# Patient Record
Sex: MALE | Race: WHITE | NOT HISPANIC OR LATINO
[De-identification: names, ages, dates, MRNs, and addresses within clinical notes are randomized per-mention and may not be internally consistent; named-entity substitution may affect disease eponyms.]

---

## 2017-02-25 PROBLEM — Z00.00 ENCOUNTER FOR PREVENTIVE HEALTH EXAMINATION: Status: ACTIVE | Noted: 2017-02-25

## 2017-04-06 ENCOUNTER — APPOINTMENT (OUTPATIENT)
Dept: UROLOGY | Facility: CLINIC | Age: 62
End: 2017-04-06

## 2017-05-25 ENCOUNTER — APPOINTMENT (OUTPATIENT)
Dept: UROLOGY | Facility: CLINIC | Age: 62
End: 2017-05-25

## 2017-05-25 VITALS
SYSTOLIC BLOOD PRESSURE: 144 MMHG | BODY MASS INDEX: 29.7 KG/M2 | DIASTOLIC BLOOD PRESSURE: 91 MMHG | WEIGHT: 196 LBS | HEIGHT: 68 IN | HEART RATE: 73 BPM

## 2017-05-25 DIAGNOSIS — R97.20 ELEVATED PROSTATE, SPECIFIC ANTIGEN [PSA]: ICD-10-CM

## 2017-06-14 ENCOUNTER — OUTPATIENT (OUTPATIENT)
Dept: OUTPATIENT SERVICES | Facility: HOSPITAL | Age: 62
LOS: 1 days | Discharge: HOME | End: 2017-06-14

## 2017-06-28 DIAGNOSIS — R97.20 ELEVATED PROSTATE SPECIFIC ANTIGEN [PSA]: ICD-10-CM

## 2017-06-29 ENCOUNTER — APPOINTMENT (OUTPATIENT)
Dept: UROLOGY | Facility: CLINIC | Age: 62
End: 2017-06-29

## 2017-06-29 ENCOUNTER — OUTPATIENT (OUTPATIENT)
Dept: OUTPATIENT SERVICES | Facility: HOSPITAL | Age: 62
LOS: 1 days | Discharge: HOME | End: 2017-06-29

## 2017-06-29 VITALS — DIASTOLIC BLOOD PRESSURE: 80 MMHG | TEMPERATURE: 89 F | SYSTOLIC BLOOD PRESSURE: 138 MMHG

## 2017-06-29 DIAGNOSIS — R68.89 OTHER GENERAL SYMPTOMS AND SIGNS: ICD-10-CM

## 2017-06-30 DIAGNOSIS — R89.7 ABNORMAL HISTOLOGICAL FINDINGS IN SPECIMENS FROM OTHER ORGANS, SYSTEMS AND TISSUES: ICD-10-CM

## 2017-07-13 ENCOUNTER — APPOINTMENT (OUTPATIENT)
Dept: UROLOGY | Facility: CLINIC | Age: 62
End: 2017-07-13

## 2017-07-13 VITALS
BODY MASS INDEX: 29.7 KG/M2 | SYSTOLIC BLOOD PRESSURE: 156 MMHG | HEART RATE: 87 BPM | HEIGHT: 68 IN | DIASTOLIC BLOOD PRESSURE: 96 MMHG | WEIGHT: 196 LBS

## 2017-09-01 ENCOUNTER — OUTPATIENT (OUTPATIENT)
Dept: OUTPATIENT SERVICES | Facility: HOSPITAL | Age: 62
LOS: 1 days | Discharge: HOME | End: 2017-09-01

## 2017-09-01 DIAGNOSIS — C61 MALIGNANT NEOPLASM OF PROSTATE: ICD-10-CM

## 2017-09-08 ENCOUNTER — OUTPATIENT (OUTPATIENT)
Dept: OUTPATIENT SERVICES | Facility: HOSPITAL | Age: 62
LOS: 1 days | Discharge: HOME | End: 2017-09-08

## 2017-09-08 DIAGNOSIS — C61 MALIGNANT NEOPLASM OF PROSTATE: ICD-10-CM

## 2017-09-21 ENCOUNTER — APPOINTMENT (OUTPATIENT)
Dept: UROLOGY | Facility: CLINIC | Age: 62
End: 2017-09-21
Payer: COMMERCIAL

## 2017-09-21 VITALS
WEIGHT: 196 LBS | SYSTOLIC BLOOD PRESSURE: 144 MMHG | HEART RATE: 87 BPM | BODY MASS INDEX: 29.7 KG/M2 | HEIGHT: 68 IN | DIASTOLIC BLOOD PRESSURE: 87 MMHG

## 2017-09-21 PROCEDURE — 99213 OFFICE O/P EST LOW 20 MIN: CPT

## 2018-03-22 ENCOUNTER — APPOINTMENT (OUTPATIENT)
Dept: UROLOGY | Facility: CLINIC | Age: 63
End: 2018-03-22
Payer: COMMERCIAL

## 2018-03-22 VITALS
WEIGHT: 196 LBS | HEIGHT: 68 IN | SYSTOLIC BLOOD PRESSURE: 120 MMHG | HEART RATE: 77 BPM | BODY MASS INDEX: 29.7 KG/M2 | DIASTOLIC BLOOD PRESSURE: 77 MMHG

## 2018-03-22 DIAGNOSIS — Z87.828 PERSONAL HISTORY OF OTHER (HEALED) PHYSICAL INJURY AND TRAUMA: ICD-10-CM

## 2018-03-22 DIAGNOSIS — Z78.9 OTHER SPECIFIED HEALTH STATUS: ICD-10-CM

## 2018-03-22 DIAGNOSIS — R97.20 ELEVATED PROSTATE, SPECIFIC ANTIGEN [PSA]: ICD-10-CM

## 2018-03-22 DIAGNOSIS — Z86.39 PERSONAL HISTORY OF OTHER ENDOCRINE, NUTRITIONAL AND METABOLIC DISEASE: ICD-10-CM

## 2018-03-22 DIAGNOSIS — Z86.79 PERSONAL HISTORY OF OTHER DISEASES OF THE CIRCULATORY SYSTEM: ICD-10-CM

## 2018-03-22 DIAGNOSIS — Z83.3 FAMILY HISTORY OF DIABETES MELLITUS: ICD-10-CM

## 2018-03-22 PROCEDURE — 99213 OFFICE O/P EST LOW 20 MIN: CPT

## 2018-03-22 NOTE — LETTER HEADER
[Ryan Baker MD] : Ryan Bkaer MD [Director of Urologic Oncology] : Director of Urologic Oncology [Cancer Services] : Cancer Services [Tel [phone]] : Tel [phone] [Fax [phone]] : Fax [phone]

## 2018-03-22 NOTE — LETTER BODY
[Dear  ___] : Dear  [unfilled], [I had the pleasure of evaluating your patient, [unfilled]. Thank you for referring this patient for consultation for ___] : I had the pleasure of evaluating your patient, [unfilled]. Thank you for referring this patient for consultation for [unfilled]. [Attached please find my note.] : Attached please find my note. [Thank you very much for allowing me to participate in the care of this patient. If you have any questions, please do not hesitate to contact me] : Thank you very much for allowing me to participate in the care of this patient. If you have any questions, please do not hesitate to contact me. [FreeTextEntry2] : Dr Kayden Day

## 2018-03-22 NOTE — HISTORY OF PRESENT ILLNESS
[FreeTextEntry1] : KAREN BROWN is a 62 year old male with a past medical history of elevated PSA. Presents to the office today for a follow up, last seen on 9/21/2017. Patient had a prostate biopsy on 6/29/2017 and revealed low volume Karla 3+3 in 2% of 1 core in the mid base right side. Last PSA 3.11 on 9/11/2017. Patient did not have PSA performed this visit, scripts given to him to have done. MRI prostate done on 9/8/2017 revealed Pirads 2.

## 2018-03-22 NOTE — ASSESSMENT
[FreeTextEntry1] : 63 yo with low risk, low volume prostate cancer\par \par MRI 9/2017 - \par volume 35 grams\par PIRADS 2\par \par \par PSA - 3.11 9/2017\par \par reports no new complaints\par \par - PSA in 6 months\par - MRI in 6 months\par - F/U in 6 months\par \par patient is on surveillance for prostate cancer

## 2018-04-06 ENCOUNTER — OUTPATIENT (OUTPATIENT)
Dept: OUTPATIENT SERVICES | Facility: HOSPITAL | Age: 63
LOS: 1 days | Discharge: HOME | End: 2018-04-06

## 2018-04-06 ENCOUNTER — LABORATORY RESULT (OUTPATIENT)
Age: 63
End: 2018-04-06

## 2018-04-06 DIAGNOSIS — I10 ESSENTIAL (PRIMARY) HYPERTENSION: ICD-10-CM

## 2018-04-06 DIAGNOSIS — E78.00 PURE HYPERCHOLESTEROLEMIA, UNSPECIFIED: ICD-10-CM

## 2018-04-06 DIAGNOSIS — E66.9 OBESITY, UNSPECIFIED: ICD-10-CM

## 2018-04-09 LAB
APPEARANCE: CLEAR
BACTERIA UR CULT: NORMAL
BILIRUBIN URINE: NEGATIVE
BLOOD URINE: NEGATIVE
COLOR: YELLOW
GLUCOSE QUALITATIVE U: NEGATIVE MG/DL
KETONES URINE: NEGATIVE
LEUKOCYTE ESTERASE URINE: NEGATIVE
NITRITE URINE: NEGATIVE
PH URINE: 6.5
PROTEIN URINE: NEGATIVE MG/DL
PSA FREE FLD-MCNC: 15.8
PSA FREE SERPL-MCNC: 0.71 NG/ML
PSA SERPL-MCNC: 4.48 NG/ML
SPECIFIC GRAVITY URINE: 1.03
UROBILINOGEN URINE: NEGATIVE MG/DL

## 2018-08-24 ENCOUNTER — OUTPATIENT (OUTPATIENT)
Dept: OUTPATIENT SERVICES | Facility: HOSPITAL | Age: 63
LOS: 1 days | Discharge: HOME | End: 2018-08-24
Payer: COMMERCIAL

## 2018-08-24 PROCEDURE — 93970 EXTREMITY STUDY: CPT | Mod: 26

## 2018-08-29 DIAGNOSIS — M79.89 OTHER SPECIFIED SOFT TISSUE DISORDERS: ICD-10-CM

## 2018-09-03 PROBLEM — R97.20 ELEVATED PROSTATE SPECIFIC ANTIGEN (PSA): Status: ACTIVE | Noted: 2017-05-25

## 2018-09-21 ENCOUNTER — LABORATORY RESULT (OUTPATIENT)
Age: 63
End: 2018-09-21

## 2018-09-21 ENCOUNTER — OUTPATIENT (OUTPATIENT)
Dept: OUTPATIENT SERVICES | Facility: HOSPITAL | Age: 63
LOS: 1 days | Discharge: HOME | End: 2018-09-21

## 2018-09-21 DIAGNOSIS — R97.20 ELEVATED PROSTATE SPECIFIC ANTIGEN [PSA]: ICD-10-CM

## 2018-09-26 ENCOUNTER — FORM ENCOUNTER (OUTPATIENT)
Age: 63
End: 2018-09-26

## 2018-09-27 ENCOUNTER — OUTPATIENT (OUTPATIENT)
Dept: OUTPATIENT SERVICES | Facility: HOSPITAL | Age: 63
LOS: 1 days | Discharge: HOME | End: 2018-09-27

## 2018-09-27 DIAGNOSIS — R97.20 ELEVATED PROSTATE SPECIFIC ANTIGEN [PSA]: ICD-10-CM

## 2018-10-02 ENCOUNTER — OUTPATIENT (OUTPATIENT)
Dept: OUTPATIENT SERVICES | Facility: HOSPITAL | Age: 63
LOS: 1 days | Discharge: HOME | End: 2018-10-02

## 2018-10-02 DIAGNOSIS — I10 ESSENTIAL (PRIMARY) HYPERTENSION: ICD-10-CM

## 2018-10-02 DIAGNOSIS — E66.9 OBESITY, UNSPECIFIED: ICD-10-CM

## 2018-10-02 DIAGNOSIS — E78.5 HYPERLIPIDEMIA, UNSPECIFIED: ICD-10-CM

## 2018-10-08 ENCOUNTER — APPOINTMENT (OUTPATIENT)
Dept: UROLOGY | Facility: CLINIC | Age: 63
End: 2018-10-08
Payer: COMMERCIAL

## 2018-10-08 VITALS
HEART RATE: 88 BPM | DIASTOLIC BLOOD PRESSURE: 91 MMHG | SYSTOLIC BLOOD PRESSURE: 135 MMHG | HEIGHT: 68 IN | WEIGHT: 196 LBS | BODY MASS INDEX: 29.7 KG/M2

## 2018-10-08 DIAGNOSIS — N52.9 MALE ERECTILE DYSFUNCTION, UNSPECIFIED: ICD-10-CM

## 2018-10-08 PROCEDURE — 99213 OFFICE O/P EST LOW 20 MIN: CPT

## 2018-10-08 RX ORDER — CIPROFLOXACIN HYDROCHLORIDE 500 MG/1
500 TABLET, FILM COATED ORAL
Qty: 6 | Refills: 0 | Status: ACTIVE | COMMUNITY
Start: 2018-10-08 | End: 1900-01-01

## 2018-10-08 RX ORDER — ENEMA 19; 7 G/133ML; G/133ML
7-19 ENEMA RECTAL
Qty: 1 | Refills: 0 | Status: ACTIVE | COMMUNITY
Start: 2018-10-08 | End: 1900-01-01

## 2018-10-09 NOTE — LETTER BODY
[Dear  ___] : Dear  [unfilled], [Consult Letter:] : I had the pleasure of evaluating your patient, [unfilled]. [Please see my note below.] : Please see my note below. [Consult Closing:] : Thank you very much for allowing me to participate in the care of this patient.  If you have any questions, please do not hesitate to contact me. [Sincerely,] : Sincerely, [FreeTextEntry3] : Harpreet Baker MD, FACS\par

## 2018-10-09 NOTE — ASSESSMENT
[FreeTextEntry1] : 63 yo with low risk, low volume prostate cancer\par \par MRI 9/2018 - \par volume 50 grams\par PIRADS 3\par \par PSA - 3.85 9/2018\par \par reports no new complaints\par \par - PSA in 6 months\par - repeat biopsy in 6 months

## 2018-10-09 NOTE — HISTORY OF PRESENT ILLNESS
[Urinary Urgency] : urinary urgency [Urinary Frequency] : urinary frequency [Erectile Dysfunction] : Erectile Dysfunction [None] : None [FreeTextEntry1] : 62 yo with low volume Karla 3+3 in 2% of 1 core in the mid base right side\par prostate biopsy on June 29, 2017\par \par he is here to review his recent PSA, MRI and oncotype\par \par PSA 9/24/2018 - 3.85\par PSA 9/1/2017 - 3.11\par  MRI 9/8/2017 - 50 gram prostate\par pirads 3 \par oncotype - insufficient  volume of cancer to assess\par \par reports no new complaints [Urinary Incontinence] : no urinary incontinence [Urinary Retention] : no urinary retention

## 2019-02-01 ENCOUNTER — EMERGENCY (EMERGENCY)
Facility: HOSPITAL | Age: 64
LOS: 0 days | Discharge: HOME | End: 2019-02-01
Admitting: PHYSICIAN ASSISTANT

## 2019-02-01 VITALS
DIASTOLIC BLOOD PRESSURE: 64 MMHG | HEART RATE: 91 BPM | OXYGEN SATURATION: 97 % | RESPIRATION RATE: 18 BRPM | TEMPERATURE: 98 F | SYSTOLIC BLOOD PRESSURE: 125 MMHG

## 2019-02-01 DIAGNOSIS — Z79.899 OTHER LONG TERM (CURRENT) DRUG THERAPY: ICD-10-CM

## 2019-02-01 DIAGNOSIS — E78.00 PURE HYPERCHOLESTEROLEMIA, UNSPECIFIED: ICD-10-CM

## 2019-02-01 DIAGNOSIS — S61.317A LACERATION WITHOUT FOREIGN BODY OF LEFT LITTLE FINGER WITH DAMAGE TO NAIL, INITIAL ENCOUNTER: ICD-10-CM
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
## 2019-02-01 DIAGNOSIS — I10 ESSENTIAL (PRIMARY) HYPERTENSION: ICD-10-CM

## 2019-02-01 DIAGNOSIS — Z23 ENCOUNTER FOR IMMUNIZATION: ICD-10-CM
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
## 2019-02-01 DIAGNOSIS — E78.5 HYPERLIPIDEMIA, UNSPECIFIED: ICD-10-CM

## 2019-02-01 DIAGNOSIS — M79.646 PAIN IN UNSPECIFIED FINGER(S): ICD-10-CM
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
## 2019-02-01 RX ORDER — TETANUS TOXOID, REDUCED DIPHTHERIA TOXOID AND ACELLULAR PERTUSSIS VACCINE, ADSORBED 5; 2.5; 8; 8; 2.5 [IU]/.5ML; [IU]/.5ML; UG/.5ML; UG/.5ML; UG/.5ML
0.5 SUSPENSION INTRAMUSCULAR ONCE
Qty: 0 | Refills: 0 | Status: COMPLETED | OUTPATIENT
Start: 2019-02-01 | End: 2019-02-01

## 2019-02-01 RX ORDER — CEPHALEXIN 500 MG
1 CAPSULE ORAL
Qty: 28 | Refills: 0
Start: 2019-02-01 | End: 2019-02-07

## 2019-02-01 RX ADMIN — TETANUS TOXOID, REDUCED DIPHTHERIA TOXOID AND ACELLULAR PERTUSSIS VACCINE, ADSORBED 0.5 MILLILITER(S): 5; 2.5; 8; 8; 2.5 SUSPENSION INTRAMUSCULAR at 17:04

## 2019-02-01 NOTE — ED PROVIDER NOTE - NS ED ROS FT
Review of Systems:  	•	CONSTITUTIONAL - no fever, no diaphoresis, no chills  	•	SKIN - + laceration   	•	MUSCULOSKELETAL - no joint paint, no swelling, no redness  	•	NEUROLOGIC - no weakness, no headache, no paresthesias

## 2019-02-01 NOTE — ED PROVIDER NOTE - CARE PLAN
Principal Discharge DX:	Nail avulsion, finger  Secondary Diagnosis:	Laceration of finger of left hand

## 2019-02-01 NOTE — ED PROVIDER NOTE - NSFOLLOWUPINSTRUCTIONS_ED_ALL_ED_FT
Laceration    A laceration is a cut that goes through all of the layers of the skin and into the tissue that is right under the skin. Some lacerations heal on their own. Others need to be closed with stitches (sutures), staples, skin adhesive strips, or skin glue. Proper laceration care minimizes the risk of infection and helps the laceration to heal better.     SEEK IMMEDIATE MEDICAL CARE IF YOU HAVE THE FOLLOWING SYMPTOMS: swelling around the wound, worsening pain, drainage from the wound, red streaking going away from your wound, inability to move finger or toe near the laceration, or discoloration of skin near the laceration. 14-Nov-2017

## 2019-02-01 NOTE — ED PROVIDER NOTE - OBJECTIVE STATEMENT
63 year old male with pmhx of HTN and HLD, presents with finger injury. Pt admits sliced left 5th digit with hammer. Pt denies numbness.

## 2019-02-01 NOTE — ED PROVIDER NOTE - PHYSICAL EXAMINATION
CONST: Well appearing in NAD  MS: Normal ROM. pulses 2 +  SKIN: + small laceration to left 5th digit, distal aspect, medial aspect involving nail. no tendon involvement   NEURO: Strength 5/5 with no sensory deficits.

## 2019-02-01 NOTE — ED PROVIDER NOTE - CARE PROVIDER_API CALL
Lydia Wolfe (MD)  Surgery of the Hand  [facility] [place] [address]  Phone: [phone]  Fax: [phone]

## 2019-04-04 ENCOUNTER — LABORATORY RESULT (OUTPATIENT)
Age: 64
End: 2019-04-04

## 2019-04-04 ENCOUNTER — OUTPATIENT (OUTPATIENT)
Dept: OUTPATIENT SERVICES | Facility: HOSPITAL | Age: 64
LOS: 1 days | Discharge: HOME | End: 2019-04-04

## 2019-04-04 DIAGNOSIS — C61 MALIGNANT NEOPLASM OF PROSTATE: ICD-10-CM

## 2019-04-04 PROBLEM — I10 ESSENTIAL (PRIMARY) HYPERTENSION: Chronic | Status: ACTIVE | Noted: 2019-02-01

## 2019-04-04 PROBLEM — E78.00 PURE HYPERCHOLESTEROLEMIA, UNSPECIFIED: Chronic | Status: ACTIVE | Noted: 2019-02-01

## 2019-04-06 ENCOUNTER — LABORATORY RESULT (OUTPATIENT)
Age: 64
End: 2019-04-06

## 2019-04-08 RX ORDER — CIPROFLOXACIN HYDROCHLORIDE 500 MG/1
500 TABLET, FILM COATED ORAL
Qty: 8 | Refills: 0 | Status: ACTIVE | COMMUNITY
Start: 2019-04-08 | End: 1900-01-01

## 2019-04-25 ENCOUNTER — APPOINTMENT (OUTPATIENT)
Dept: UROLOGY | Facility: CLINIC | Age: 64
End: 2019-04-25
Payer: COMMERCIAL

## 2019-04-25 VITALS — BODY MASS INDEX: 29.7 KG/M2 | HEIGHT: 68 IN | WEIGHT: 196 LBS

## 2019-04-25 PROCEDURE — 99214 OFFICE O/P EST MOD 30 MIN: CPT

## 2019-04-25 NOTE — HISTORY OF PRESENT ILLNESS
[Urinary Urgency] : urinary urgency [Urinary Frequency] : urinary frequency [Erectile Dysfunction] : Erectile Dysfunction [None] : None [FreeTextEntry1] : 62 yo with low volume Karla 3+3 in 2% of 1 core in the mid base right side\par prostate biopsy on June 29, 2017\par \par he is here to review his recent PSA\par \par PSA 4/8/2019 - 4.12 with 15% free \par PSA 9/24/2018 - 3.85\par PSA 9/1/2017 - 3.11\par  MRI 9/8/2017 - 50 gram prostate\par pirads 3 \par oncotype - insufficient  volume of cancer to assess\par \par reports no new complaints [Urinary Incontinence] : no urinary incontinence [Urinary Retention] : no urinary retention

## 2019-04-25 NOTE — ASSESSMENT
[FreeTextEntry1] : 64 yo with low risk, low volume prostate cancer\par \par MRI 9/2018 - \par volume 50 grams\par PIRADS 3\par \par PSA - 4.12  4/8/2019\par \par \par - PSA in 3 months\par - repeat MRI in 3 months\par - repeat biopsy dependent on MRI findings \par \par I explained to Mr. Soares the need for a repeat biopsy\par but I explained that the MRI would help identify regions of interest to biopsy \par \par - f/u 7/2019

## 2019-05-23 ENCOUNTER — APPOINTMENT (OUTPATIENT)
Dept: UROLOGY | Facility: CLINIC | Age: 64
End: 2019-05-23

## 2019-06-04 ENCOUNTER — OTHER (OUTPATIENT)
Age: 64
End: 2019-06-04

## 2019-07-11 ENCOUNTER — OUTPATIENT (OUTPATIENT)
Dept: OUTPATIENT SERVICES | Facility: HOSPITAL | Age: 64
LOS: 1 days | Discharge: HOME | End: 2019-07-11

## 2019-07-11 DIAGNOSIS — E78.00 PURE HYPERCHOLESTEROLEMIA, UNSPECIFIED: ICD-10-CM

## 2019-07-11 DIAGNOSIS — E11.9 TYPE 2 DIABETES MELLITUS WITHOUT COMPLICATIONS: ICD-10-CM

## 2019-07-11 DIAGNOSIS — C61 MALIGNANT NEOPLASM OF PROSTATE: ICD-10-CM

## 2019-07-21 ENCOUNTER — FORM ENCOUNTER (OUTPATIENT)
Age: 64
End: 2019-07-21

## 2019-07-22 ENCOUNTER — OUTPATIENT (OUTPATIENT)
Dept: OUTPATIENT SERVICES | Facility: HOSPITAL | Age: 64
LOS: 1 days | Discharge: HOME | End: 2019-07-22
Payer: COMMERCIAL

## 2019-07-22 DIAGNOSIS — C61 MALIGNANT NEOPLASM OF PROSTATE: ICD-10-CM

## 2019-07-22 PROCEDURE — 72197 MRI PELVIS W/O & W/DYE: CPT | Mod: 26

## 2019-07-25 ENCOUNTER — APPOINTMENT (OUTPATIENT)
Dept: UROLOGY | Facility: CLINIC | Age: 64
End: 2019-07-25
Payer: COMMERCIAL

## 2019-07-25 PROCEDURE — 99213 OFFICE O/P EST LOW 20 MIN: CPT

## 2019-07-25 NOTE — ASSESSMENT
[FreeTextEntry1] : 65 yo with low risk, low volume prostate cancer\par \par MRI 7/2019 - \par Volume 55 grams\par PIRADS 2\par \par PSA - 4.28   7/11/2019\par PSA - 4.12   4/8/2019\par \par - PSA in 6 months\par - f/u in 6 months \par \par

## 2019-07-25 NOTE — HISTORY OF PRESENT ILLNESS
[Urinary Urgency] : urinary urgency [Urinary Frequency] : urinary frequency [Erectile Dysfunction] : Erectile Dysfunction [None] : None [FreeTextEntry1] : 63 yo with low volume Karla 3+3 in 2% of 1 core in the mid base right side\par prostate biopsy on June 29, 2017\par \par he is here to review his recent PSA and MRI\par \par PSA 7/11/2019 - 4.83 with 17% free\par PSA 4/8/2019 - 4.12 with 15% free \par PSA 9/24/2018 - 3.85\par PSA 9/1/2017 - 3.11\par \par MRI 7/22/2019 \par 55 grams \par no hemorrhage\par BPH yes\par no lesions PIRADS 2\par MRI 9/8/2017 - 50 gram prostate\par pirads 3 \par oncotype - insufficient  volume of cancer to assess\par \par reports no new complaints [Urinary Incontinence] : no urinary incontinence [Urinary Retention] : no urinary retention

## 2020-01-22 ENCOUNTER — LABORATORY RESULT (OUTPATIENT)
Age: 65
End: 2020-01-22

## 2020-01-27 ENCOUNTER — APPOINTMENT (OUTPATIENT)
Dept: UROLOGY | Facility: CLINIC | Age: 65
End: 2020-01-27
Payer: COMMERCIAL

## 2020-01-27 VITALS — BODY MASS INDEX: 29.7 KG/M2 | WEIGHT: 196 LBS | HEIGHT: 68 IN

## 2020-01-27 PROCEDURE — 99214 OFFICE O/P EST MOD 30 MIN: CPT

## 2020-01-27 NOTE — ASSESSMENT
[FreeTextEntry1] : 63 yo with low risk, low volume prostate cancer\par \par MRI 7/2019 - \par Volume 55 grams\par PIRADS 2\par \par PSA - 3.91 01/23/202\par PSA - 4.28   7/11/2019\par PSA - 4.12   4/8/2019\par \par - PSA in 6 months\par - f/u in 6 months \par \par

## 2020-01-27 NOTE — HISTORY OF PRESENT ILLNESS
[Urinary Urgency] : urinary urgency [Urinary Frequency] : urinary frequency [Erectile Dysfunction] : Erectile Dysfunction [None] : None [FreeTextEntry1] : 65 yo with low volume Karla 3+3 in 2% of 1 core in the mid base right side\par prostate biopsy on June 29, 2017\par \par he is here to review his recent PSA \par \par PSA 1/22/20    - 3.91 with 17% free\par PSA 7/11/2019 - 4.83 with 17% free\par PSA 4/8/2019 - 4.12 with 15% free \par PSA 9/24/2018 - 3.85\par PSA 9/1/2017 - 3.11\par \par MRI 7/22/2019 \par 55 grams \par no hemorrhage\par BPH yes\par no lesions PIRADS 2\par \par MRI 9/8/2017 - 50 gram prostate\par pirads 3 \par oncotype - insufficient  volume of cancer to assess\par \par reports no new complaints [Urinary Incontinence] : no urinary incontinence [Urinary Retention] : no urinary retention

## 2020-07-01 LAB
PSA FREE FLD-MCNC: 17 %
PSA FREE SERPL-MCNC: 0.7 NG/ML
PSA SERPL-MCNC: 4.21 NG/ML

## 2020-07-05 ENCOUNTER — TRANSCRIPTION ENCOUNTER (OUTPATIENT)
Age: 65
End: 2020-07-05

## 2020-08-03 ENCOUNTER — APPOINTMENT (OUTPATIENT)
Dept: UROLOGY | Facility: CLINIC | Age: 65
End: 2020-08-03
Payer: COMMERCIAL

## 2020-08-03 PROCEDURE — 99213 OFFICE O/P EST LOW 20 MIN: CPT

## 2020-08-03 NOTE — HISTORY OF PRESENT ILLNESS
[Urinary Urgency] : urinary urgency [Erectile Dysfunction] : Erectile Dysfunction [None] : None [Urinary Frequency] : urinary frequency [FreeTextEntry1] : 64 yo with low volume Karla 3+3 in 2% of 1 core in the mid base right side\par prostate biopsy on June 29, 2017\par \par he is here to review his recent PSA \par \par PSA 6/30/2020 - 4.21 with 17% free\par PSA 1/22/20    - 3.91 with 17% free\par PSA 7/11/2019 - 4.83 with 17% free\par PSA 4/8/2019 - 4.12 with 15% free \par PSA 9/24/2018 - 3.85\par PSA 9/1/2017 - 3.11\par \par MRI 7/22/2019 \par 55 grams \par no hemorrhage\par BPH yes\par no lesions PIRADS 2\par \par MRI 9/8/2017 - 50 gram prostate\par pirads 3 \par oncotype - insufficient  volume of cancer to assess\par \par reports no new complaints [Urinary Incontinence] : no urinary incontinence [Urinary Retention] : no urinary retention

## 2020-08-03 NOTE — ASSESSMENT
[FreeTextEntry1] : 64 yo with low risk, low volume prostate cancer\par \par MRI 7/2019 - \par Volume 55 grams\par PIRADS 2\par \par PSA - 4.21 07/01/2020\par PSA - 3.91 01/23/202\par PSA - 4.28   7/11/2019\par PSA - 4.12   4/8/2019\par \par - PSA in 6 months\par - f/u in 6 months \par \par

## 2020-08-03 NOTE — LETTER BODY
[Consult Letter:] : I had the pleasure of evaluating your patient, [unfilled]. [Dear  ___] : Dear  [unfilled], [Please see my note below.] : Please see my note below. [Consult Closing:] : Thank you very much for allowing me to participate in the care of this patient.  If you have any questions, please do not hesitate to contact me. [Sincerely,] : Sincerely, [FreeTextEntry3] : Harpreet Baker MD, FACS\par

## 2020-08-03 NOTE — PHYSICAL EXAM
[General Appearance - Well Developed] : well developed [General Appearance - Well Nourished] : well nourished [Well Groomed] : well groomed [Normal Appearance] : normal appearance [Abdomen Soft] : soft [General Appearance - In No Acute Distress] : no acute distress [Abdomen Tenderness] : non-tender [Costovertebral Angle Tenderness] : no ~M costovertebral angle tenderness [Edema] : no peripheral edema [Exaggerated Use Of Accessory Muscles For Inspiration] : no accessory muscle use [] : no respiratory distress [Respiration, Rhythm And Depth] : normal respiratory rhythm and effort [Oriented To Time, Place, And Person] : oriented to person, place, and time [Mood] : the mood was normal [Affect] : the affect was normal [Normal Station and Gait] : the gait and station were normal for the patient's age [Not Anxious] : not anxious [No Focal Deficits] : no focal deficits [No Palpable Adenopathy] : no palpable adenopathy

## 2020-08-19 ENCOUNTER — EMERGENCY (EMERGENCY)
Facility: HOSPITAL | Age: 65
LOS: 0 days | Discharge: HOME | End: 2020-08-19
Attending: EMERGENCY MEDICINE | Admitting: EMERGENCY MEDICINE
Payer: COMMERCIAL

## 2020-08-19 VITALS
SYSTOLIC BLOOD PRESSURE: 120 MMHG | TEMPERATURE: 98 F | OXYGEN SATURATION: 99 % | DIASTOLIC BLOOD PRESSURE: 80 MMHG | HEART RATE: 75 BPM | RESPIRATION RATE: 18 BRPM

## 2020-08-19 VITALS — HEIGHT: 68 IN | WEIGHT: 199.96 LBS

## 2020-08-19 DIAGNOSIS — H92.09 OTALGIA, UNSPECIFIED EAR: ICD-10-CM

## 2020-08-19 DIAGNOSIS — I10 ESSENTIAL (PRIMARY) HYPERTENSION: ICD-10-CM

## 2020-08-19 DIAGNOSIS — Z79.899 OTHER LONG TERM (CURRENT) DRUG THERAPY: ICD-10-CM

## 2020-08-19 DIAGNOSIS — E78.00 PURE HYPERCHOLESTEROLEMIA, UNSPECIFIED: ICD-10-CM

## 2020-08-19 DIAGNOSIS — H60.92 UNSPECIFIED OTITIS EXTERNA, LEFT EAR: ICD-10-CM

## 2020-08-19 DIAGNOSIS — Z79.01 LONG TERM (CURRENT) USE OF ANTICOAGULANTS: ICD-10-CM

## 2020-08-19 PROCEDURE — 99283 EMERGENCY DEPT VISIT LOW MDM: CPT

## 2020-08-19 RX ORDER — LOSARTAN/HYDROCHLOROTHIAZIDE 100MG-25MG
1 TABLET ORAL
Qty: 0 | Refills: 0 | DISCHARGE

## 2020-08-19 RX ORDER — NEOMYCIN/POLYMYXIN B/HYDROCORT
1 SUSPENSION, DROPS(FINAL DOSAGE FORM)(ML) OTIC (EAR) ONCE
Refills: 0 | Status: COMPLETED | OUTPATIENT
Start: 2020-08-19 | End: 2020-08-19

## 2020-08-19 RX ADMIN — Medication 1 DROP(S): at 01:58

## 2020-08-19 NOTE — ED PROVIDER NOTE - NS ED ROS FT
Constitutional: (-) fever  Eyes/ENT: (-) blurry vision, (-) epistaxis  Integumentary: (-) rash, (-) edema  Neurological: (-) headache, (-) altered mental status  Psychiatric: (-) hallucinations

## 2020-08-19 NOTE — ED ADULT TRIAGE NOTE - CHIEF COMPLAINT QUOTE
"My left ear shut down.  It's swollen and itchy for a long time"  Pt states that he went to an ENT a few weeks ago for this problem and cleaned wax from the ears

## 2020-08-19 NOTE — ED PROVIDER NOTE - NSFOLLOWUPINSTRUCTIONS_ED_ALL_ED_FT
You were provided with ear drops. Use these drops as directed: 4 drops in your left ear 4 times a day.    Otitis Externa    Otitis externa is a bacterial or fungal infection of the outer ear canal. This is the area from the eardrum to the outside of the ear. Otitis externa is sometimes called "swimmer's ear." Causes include swimming in dirty water, moisture remaining in ear after swimming or bathing, trauma to the ear, objects stuck in the ear, cuts or scrapes in our outside of the ear. Symptoms include itching, pain, swelling, redness in the ear canal, and fluid coming from the ear. To prevent recurrence of otitis media keep your ear dry, avoid scratching or putting objects inside your ear including cotton swabs, and avoid swimming in polluted water or poorly chlorinated pools.    SEEK MEDICAL CARE IF YOU HAVE THE FOLLOWING SYMPTOMS: fever, worsening symptoms, headache, redness/swelling/pain over the bone behind your ear.

## 2020-08-19 NOTE — ED PROVIDER NOTE - PHYSICAL EXAMINATION
Physical Exam    Vital Signs: I have reviewed the initial vital signs.  Constitutional: well-nourished, appears stated age, no acute distress  Eyes: Conjunctiva pink, Sclera clear, PERRLA, EOMI.  Ear: left canal swelling with redness, maceration debri and wax noted. Right canal nml with TM nml  Integumentary: warm, dry, no rash  Neurologic: awake, alert, cranial nerves II-XII grossly intact, extremities’ motor and sensory functions grossly intact  Psychiatric: appropriate mood, appropriate affect

## 2020-08-19 NOTE — ED PROVIDER NOTE - PATIENT PORTAL LINK FT
You can access the FollowMyHealth Patient Portal offered by [facility] by registering at the following website: [email or web address]. By joining [employer]’s FollowMyHealth portal, you will also be able to view your health information using other applications (apps) compatible with our system.

## 2020-08-19 NOTE — ED PROVIDER NOTE - CLINICAL SUMMARY MEDICAL DECISION MAKING FREE TEXT BOX
66 yo M, HTN, HLD here for L ear itching, fullness x 4 days. Does go swimming regularly. No fever, chills. Notes no drainage from ear but has excessive wax production recently.  VS normal. Has macerated skin in canal, debris, redness, sligt pain with pinna movement, no posterior swelling, no enlarged LNs. Otherwise unremarkable exam.  Sx suggestive of otitis externa, provided with corticosporin drops, advised on return precautions and ENT follow up.

## 2020-08-19 NOTE — ED PROVIDER NOTE - OBJECTIVE STATEMENT
65 year old male past medical history of HTN/HLD comes to emergency room for left ear fullness and decrease hearing for the last few weeks worse over the last 4 days. Patient states has a pool and goes swimming regularly. patient states that when he first started having symptoms he was seen by ENT told had some wax build up which was removed. patient states that symptoms have been getting progressively worse now having a hard time hearing. denies fever/chills. no neck pain, no headache, no dizziness

## 2020-11-04 ENCOUNTER — TRANSCRIPTION ENCOUNTER (OUTPATIENT)
Age: 65
End: 2020-11-04

## 2020-12-04 ENCOUNTER — OUTPATIENT (OUTPATIENT)
Dept: OUTPATIENT SERVICES | Facility: HOSPITAL | Age: 65
LOS: 1 days | Discharge: HOME | End: 2020-12-04

## 2020-12-04 ENCOUNTER — LABORATORY RESULT (OUTPATIENT)
Age: 65
End: 2020-12-04

## 2020-12-04 DIAGNOSIS — Z11.59 ENCOUNTER FOR SCREENING FOR OTHER VIRAL DISEASES: ICD-10-CM

## 2020-12-07 ENCOUNTER — RESULT REVIEW (OUTPATIENT)
Age: 65
End: 2020-12-07

## 2020-12-07 ENCOUNTER — OUTPATIENT (OUTPATIENT)
Dept: OUTPATIENT SERVICES | Facility: HOSPITAL | Age: 65
LOS: 1 days | Discharge: HOME | End: 2020-12-07
Payer: COMMERCIAL

## 2020-12-07 DIAGNOSIS — R97.20 ELEVATED PROSTATE SPECIFIC ANTIGEN [PSA]: ICD-10-CM

## 2020-12-07 PROCEDURE — 72197 MRI PELVIS W/O & W/DYE: CPT | Mod: 26

## 2021-01-07 ENCOUNTER — APPOINTMENT (OUTPATIENT)
Dept: UROLOGY | Facility: CLINIC | Age: 66
End: 2021-01-07
Payer: COMMERCIAL

## 2021-01-07 VITALS — TEMPERATURE: 97.5 F | HEIGHT: 68 IN | WEIGHT: 196 LBS | BODY MASS INDEX: 29.7 KG/M2

## 2021-01-07 PROCEDURE — 99213 OFFICE O/P EST LOW 20 MIN: CPT

## 2021-01-08 NOTE — HISTORY OF PRESENT ILLNESS
[Urinary Urgency] : urinary urgency [Urinary Frequency] : urinary frequency [Erectile Dysfunction] : Erectile Dysfunction [None] : None [FreeTextEntry1] : 64 yo with low volume Karla 3+3 in 2% of 1 core in the mid base right side\par prostate biopsy on June 29, 2017\par \par he is here to review his recent PSA \par \par PSA 6/30/2020 - 4.21 with 17% free\par PSA 1/22/20    - 3.91 with 17% free\par PSA 7/11/2019 - 4.83 with 17% free\par PSA 4/8/2019 - 4.12 with 15% free \par PSA 9/24/2018 - 3.85\par PSA 9/1/2017 - 3.11\par \par MRI 12/2020\par \par IMPRESSION:\par \par 1. No suspicious prostatic lesion.\par \par 2. Nonspecific geographic areas of linear and wedge-shaped signal abnormality within the peripheral zone, without corresponding diffusion or perfusion abnormality, possibly reflecting sequela of chronic prostatitis and/or fibrosis. PI-RADS 2.\par \par 3. Mild BPH.\par \par MRI 7/22/2019 \par 55 grams \par no hemorrhage\par BPH yes\par no lesions PIRADS 2\par \par MRI 9/8/2017 - 50 gram prostate\par pirads 3 \par oncotype - insufficient  volume of cancer to assess\par \par reports no new complaints [Urinary Incontinence] : no urinary incontinence [Urinary Retention] : no urinary retention

## 2021-01-11 LAB
PSA FREE FLD-MCNC: 17 %
PSA FREE SERPL-MCNC: 0.71 NG/ML
PSA SERPL-MCNC: 4.04 NG/ML

## 2021-01-25 ENCOUNTER — NON-APPOINTMENT (OUTPATIENT)
Age: 66
End: 2021-01-25

## 2021-01-26 ENCOUNTER — TRANSCRIPTION ENCOUNTER (OUTPATIENT)
Age: 66
End: 2021-01-26

## 2021-03-25 DIAGNOSIS — R35.0 FREQUENCY OF MICTURITION: ICD-10-CM

## 2021-03-25 RX ORDER — TADALAFIL 5 MG/1
5 TABLET ORAL
Qty: 30 | Refills: 11 | Status: ACTIVE | COMMUNITY
Start: 2021-03-25 | End: 1900-01-01

## 2021-05-23 ENCOUNTER — EMERGENCY (EMERGENCY)
Facility: HOSPITAL | Age: 66
LOS: 0 days | Discharge: HOME | End: 2021-05-23
Attending: EMERGENCY MEDICINE | Admitting: EMERGENCY MEDICINE
Payer: COMMERCIAL

## 2021-05-23 VITALS
RESPIRATION RATE: 18 BRPM | SYSTOLIC BLOOD PRESSURE: 155 MMHG | DIASTOLIC BLOOD PRESSURE: 95 MMHG | OXYGEN SATURATION: 97 % | HEART RATE: 80 BPM

## 2021-05-23 VITALS
OXYGEN SATURATION: 97 % | HEIGHT: 68 IN | RESPIRATION RATE: 20 BRPM | TEMPERATURE: 97 F | WEIGHT: 199.96 LBS | SYSTOLIC BLOOD PRESSURE: 138 MMHG | HEART RATE: 76 BPM | DIASTOLIC BLOOD PRESSURE: 89 MMHG

## 2021-05-23 DIAGNOSIS — I10 ESSENTIAL (PRIMARY) HYPERTENSION: ICD-10-CM

## 2021-05-23 DIAGNOSIS — S01.81XA LACERATION WITHOUT FOREIGN BODY OF OTHER PART OF HEAD, INITIAL ENCOUNTER: ICD-10-CM
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
## 2021-05-23 DIAGNOSIS — Z79.899 OTHER LONG TERM (CURRENT) DRUG THERAPY: ICD-10-CM

## 2021-05-23 PROCEDURE — 99283 EMERGENCY DEPT VISIT LOW MDM: CPT | Mod: 25

## 2021-05-23 PROCEDURE — 12011 RPR F/E/E/N/L/M 2.5 CM/<: CPT

## 2021-05-23 RX ORDER — TETANUS TOXOID, REDUCED DIPHTHERIA TOXOID AND ACELLULAR PERTUSSIS VACCINE, ADSORBED 5; 2.5; 8; 8; 2.5 [IU]/.5ML; [IU]/.5ML; UG/.5ML; UG/.5ML; UG/.5ML
0.5 SUSPENSION INTRAMUSCULAR ONCE
Refills: 0 | Status: DISCONTINUED | OUTPATIENT
Start: 2021-05-23 | End: 2021-05-23

## 2021-05-23 NOTE — ED PROVIDER NOTE - PHYSICAL EXAMINATION
CONSTITUTIONAL: Well-developed; well-nourished; in no acute distress.   SKIN: 1 cm laceration above R eyebrow  HEAD: Normocephalic  EYES: PERRL, EOMI, normal sclera and conjunctiva   ENT: No nasal discharge; airway clear.  NECK: Supple; non tender.  CARD: S1, S2 normal; no murmurs, gallops, or rubs. Regular rate and rhythm.   RESP: No wheezes, rales or rhonchi.  ABD: soft ntnd  EXT: Normal ROM.  No clubbing, cyanosis or edema.   NEURO: Alert, oriented, grossly unremarkable

## 2021-05-23 NOTE — ED PROVIDER NOTE - NSFOLLOWUPINSTRUCTIONS_ED_ALL_ED_FT
Laceration    A laceration is a cut that goes through all of the layers of the skin and into the tissue that is right under the skin. Some lacerations heal on their own. Others need to be closed with stitches (sutures), staples, skin adhesive strips, or skin glue. Proper laceration care minimizes the risk of infection and helps the laceration to heal better.     SEEK IMMEDIATE MEDICAL CARE IF YOU HAVE THE FOLLOWING SYMPTOMS: swelling around the wound, worsening pain, drainage from the wound, red streaking going away from your wound, inability to move finger or toe near the laceration, or discoloration of skin near the laceration. Please return to ED in 5 days to remove sutures.     Laceration    A laceration is a cut that goes through all of the layers of the skin and into the tissue that is right under the skin. Some lacerations heal on their own. Others need to be closed with stitches (sutures), staples, skin adhesive strips, or skin glue. Proper laceration care minimizes the risk of infection and helps the laceration to heal better.     SEEK IMMEDIATE MEDICAL CARE IF YOU HAVE THE FOLLOWING SYMPTOMS: swelling around the wound, worsening pain, drainage from the wound, red streaking going away from your wound, inability to move finger or toe near the laceration, or discoloration of skin near the laceration.

## 2021-05-23 NOTE — ED PROVIDER NOTE - NS ED ROS FT
Constitutional:  see HPI  Head:  no headache, dizziness, loss of consciousness  Eyes:  no visual changes; no eye pain, redness, or discharge  ENMT:  no ear pain or discharge; no hearing problems; no mouth or throat sores or lesions; no throat pain  Cardiac: no chest pain, tachycardia or palpitations  Respiratory: no cough, wheezing, shortness of breath, chest tightness, or trouble breathing  GI: no nausea, vomiting, diarrhea or abdominal pain  MS: no myalgias, muscle weakness, joint pain,or  injury; no joint swelling  Neuro: no weakness; no numbness or tingling; no seizure  Skin:  laceration

## 2021-05-23 NOTE — ED ADULT NURSE NOTE - OBJECTIVE STATEMENT
Patient reports he was hit by a door nail sticking out to his head causing laceration above right brow, denies LOC.

## 2021-05-23 NOTE — ED PROVIDER NOTE - CLINICAL SUMMARY MEDICAL DECISION MAKING FREE TEXT BOX
65M witj right forehead lac when working at home- door had a protruding nail which struck his forehead. No loc, vision changes. right forehead- 1 cm linear lac- cleaned- no fb.- sutured- given tetanus. fu for suture removed in 7-10 days. dc home

## 2021-05-23 NOTE — ED PROVIDER NOTE - OBJECTIVE STATEMENT
66 yo male hx of htn, hld presenting with laceration above R eyebrow after hitting screw on door 1 hour PTA. no loc, no headache or blurry vision. unsure of last tetanus.

## 2021-05-23 NOTE — ED PROVIDER NOTE - PATIENT PORTAL LINK FT
You can access the FollowMyHealth Patient Portal offered by [facility] by registering at the following website: [email or web address]. By joining [employer]’s FollowMyHealth portal, you will also be able to view your health information using other applications (apps) compatible with our system.

## 2021-05-30 ENCOUNTER — EMERGENCY (EMERGENCY)
Facility: HOSPITAL | Age: 66
LOS: 0 days | Discharge: HOME | End: 2021-05-30
Attending: EMERGENCY MEDICINE | Admitting: EMERGENCY MEDICINE
Payer: COMMERCIAL

## 2021-05-30 VITALS
RESPIRATION RATE: 18 BRPM | TEMPERATURE: 98 F | SYSTOLIC BLOOD PRESSURE: 151 MMHG | HEART RATE: 81 BPM | OXYGEN SATURATION: 97 % | DIASTOLIC BLOOD PRESSURE: 97 MMHG | HEIGHT: 68 IN

## 2021-05-30 VITALS — WEIGHT: 199.96 LBS

## 2021-05-30 DIAGNOSIS — I10 ESSENTIAL (PRIMARY) HYPERTENSION: ICD-10-CM

## 2021-05-30 DIAGNOSIS — E78.00 PURE HYPERCHOLESTEROLEMIA, UNSPECIFIED: ICD-10-CM

## 2021-05-30 DIAGNOSIS — Z48.02 ENCOUNTER FOR REMOVAL OF SUTURES: ICD-10-CM

## 2021-05-30 DIAGNOSIS — Z79.899 OTHER LONG TERM (CURRENT) DRUG THERAPY: ICD-10-CM

## 2021-05-30 PROCEDURE — L9995: CPT

## 2021-05-30 NOTE — ED PROVIDER NOTE - CLINICAL SUMMARY MEDICAL DECISION MAKING FREE TEXT BOX
Patient presented for suture removal from forehead s/p placement 1 week ago. Otherwise afebrile, HD stable, wound clean, dry, intact, well healing, no evidence of infection. Sutures removed without difficulty. Will discharge home with outpatient follow up. Patient agreeable with plan. Agrees to return to ED for any new or worsening symptoms.

## 2021-05-30 NOTE — ED ADULT NURSE NOTE - OBJECTIVE STATEMENT
pt presents with suture removal to the right side of forehead place in over a week ago. no sign of infection note.

## 2021-05-30 NOTE — ED PROVIDER NOTE - PATIENT PORTAL LINK FT
You can access the FollowMyHealth Patient Portal offered by [facility] by registering at the following website: [email or web address]. By joining [employer]’s FollowMyHealth portal, you will also be able to view your health information using other applications (apps) compatible with our system.

## 2021-05-30 NOTE — ED PROVIDER NOTE - OBJECTIVE STATEMENT
66 y/o male presents to the ED for suture removal from right forehead s/p placed 1 week. no fever/ chills/ drainage

## 2021-05-30 NOTE — ED PROVIDER NOTE - ATTENDING CONTRIBUTION TO CARE
65 year old male presenting for suture removal from right forehead s/p placement 1 week ago. No other complaints, no fevers.    Exam shows (+) healed laceration right forehead with 3 sutures intact; no redness/ warmth/ drainage    Will remove sutures, discharge.

## 2021-07-08 ENCOUNTER — LABORATORY RESULT (OUTPATIENT)
Age: 66
End: 2021-07-08

## 2021-07-15 ENCOUNTER — APPOINTMENT (OUTPATIENT)
Dept: UROLOGY | Facility: CLINIC | Age: 66
End: 2021-07-15
Payer: COMMERCIAL

## 2021-07-15 VITALS — HEIGHT: 68 IN | BODY MASS INDEX: 29.7 KG/M2 | WEIGHT: 196 LBS

## 2021-07-15 PROCEDURE — 99214 OFFICE O/P EST MOD 30 MIN: CPT

## 2021-07-18 NOTE — LETTER BODY
[Dear  ___] : Dear  [unfilled], [Consult Letter:] : I had the pleasure of evaluating your patient, [unfilled]. [Please see my note below.] : Please see my note below. [Sincerely,] : Sincerely, [FreeTextEntry3] : Harpreet Baker MD, FACS\par

## 2021-07-18 NOTE — HISTORY OF PRESENT ILLNESS
[FreeTextEntry1] : 67 yo with low volume Karla 3+3 in 2% of 1 core in the mid base right side\par prostate biopsy on June 29, 2017\par \par he is here to review his recent PSA \par \par PSA 7/08/2021 - 4.33 ng/ml 18% free\par PSA 1/07/2021 - 4.04 ng/ml 17% free\par PSA 6/30/2020 - 4.21 with 17% free\par PSA 1/22/20    - 3.91 with 17% free\par PSA 7/11/2019 - 4.83 with 17% free\par PSA 4/8/2019 - 4.12 with 15% free \par PSA 9/24/2018 - 3.85\par PSA 9/1/2017 - 3.11\par \par MRI 12/2020\par \par IMPRESSION:\par \par 1. No suspicious prostatic lesion.\par \par 2. Nonspecific geographic areas of linear and wedge-shaped signal abnormality within the peripheral zone, without corresponding diffusion or perfusion abnormality, possibly reflecting sequela of chronic prostatitis and/or fibrosis. PI-RADS 2.\par \par 3. Mild BPH.\par \par MRI 7/22/2019 \par 55 grams \par no hemorrhage\par BPH yes\par no lesions PIRADS 2\par \par MRI 9/8/2017 - 50 gram prostate\par pirads 3 \par oncotype - insufficient  volume of cancer to assess\par \par reports no new complaints

## 2021-07-18 NOTE — ASSESSMENT
[FreeTextEntry1] : 67 yo with low risk, low volume prostate cancer\par \par MRI 12/2020\par \par IMPRESSION:\par \par 1. No suspicious prostatic lesion.\par \par 2. Nonspecific geographic areas of linear and wedge-shaped signal abnormality within the peripheral zone, without corresponding diffusion or perfusion abnormality, possibly reflecting sequela of chronic prostatitis and/or fibrosis. PI-RADS 2.\par \par 3. Mild BPH.\par \par PSA 7/08/2021 - 4.33 ng/ml 18% free\par PSA 1/07/2021 - 4.04 ng/ml 17% free\par PSA 6/30/2020 - 4.21 with 17% free\par PSA 1/22/20    - 3.91 with 17% free\par PSA 7/11/2019 - 4.83 with 17% free\par PSA 4/8/2019 - 4.12 with 15% free \par PSA 9/24/2018 - 3.85\par PSA 9/1/2017 - 3.11\par \par - PSA in 6 months\par - MRI in 6 months\par \par discussed the findings in detail \par all questions answered\par \par

## 2021-07-25 ENCOUNTER — TRANSCRIPTION ENCOUNTER (OUTPATIENT)
Age: 66
End: 2021-07-25

## 2021-08-16 ENCOUNTER — TRANSCRIPTION ENCOUNTER (OUTPATIENT)
Age: 66
End: 2021-08-16

## 2021-12-14 NOTE — ED PROVIDER NOTE - NS ED MD EM SELECTION
Referral faxed again to Dr Mancia. Spoke with patient. Informed referral faxed to Dr Keenan to [phone].  Patient verbalized understanding.     Wrier placed call to Dr Keenan.  [phone]. Spoke with Brynn from [facility] [employer].  States they received the referral for IR.  Will forward to Nuclear medicine for them to follow up.  Writer asked if actual orders, instead of referral, is needed to call Dr Aguero's office.  Brynn verbalized understanding.     Call placed to patient.  Informed of above.  Advised to call the the number provided [phone] prior to going to [facility] [employer] to verify.  Advised if any issues to go to ED.  Patient agreeable.    [address]226 Wound Check/Suture Removal (no E&M)

## 2022-01-19 ENCOUNTER — OUTPATIENT (OUTPATIENT)
Dept: OUTPATIENT SERVICES | Facility: HOSPITAL | Age: 67
LOS: 1 days | Discharge: HOME | End: 2022-01-19
Payer: COMMERCIAL

## 2022-01-19 ENCOUNTER — RESULT REVIEW (OUTPATIENT)
Age: 67
End: 2022-01-19

## 2022-01-19 DIAGNOSIS — C61 MALIGNANT NEOPLASM OF PROSTATE: ICD-10-CM

## 2022-01-19 PROCEDURE — 72197 MRI PELVIS W/O & W/DYE: CPT | Mod: 26

## 2022-01-20 ENCOUNTER — APPOINTMENT (OUTPATIENT)
Dept: UROLOGY | Facility: CLINIC | Age: 67
End: 2022-01-20
Payer: COMMERCIAL

## 2022-01-20 VITALS — BODY MASS INDEX: 30.31 KG/M2 | WEIGHT: 200 LBS | HEIGHT: 68 IN

## 2022-01-20 DIAGNOSIS — N13.8 BENIGN PROSTATIC HYPERPLASIA WITH LOWER URINARY TRACT SYMPMS: ICD-10-CM

## 2022-01-20 DIAGNOSIS — N40.1 BENIGN PROSTATIC HYPERPLASIA WITH LOWER URINARY TRACT SYMPMS: ICD-10-CM

## 2022-01-20 PROCEDURE — 99214 OFFICE O/P EST MOD 30 MIN: CPT

## 2022-01-24 PROBLEM — N40.1 BENIGN LOCALIZED HYPERPLASIA OF PROSTATE WITH URINARY OBSTRUCTION: Status: ACTIVE | Noted: 2022-01-24

## 2022-01-24 NOTE — HISTORY OF PRESENT ILLNESS
[FreeTextEntry1] : 67 yo with low volume Karla 3+3 in 2% of 1 core in the mid base right side\par prostate biopsy on June 29, 2017\par \par he is here to review his recent PSA \par \par PSA 01/2022   - 5.95 ng/ml \par PSA 7/08/2021 - 4.33 ng/ml 18% free\par PSA 1/07/2021 - 4.04 ng/ml 17% free\par PSA 6/30/2020 - 4.21 with 17% free\par PSA 1/22/20    - 3.91 with 17% free\par PSA 7/11/2019 - 4.83 with 17% free\par PSA 4/8/2019 - 4.12 with 15% free \par PSA 9/24/2018 - 3.85\par PSA 9/1/2017 - 3.11\par \par \par MRI 1/2022\par PIRADS 2\par 59 gram prostate\par PSA density 0.07 ng/ml\par \par MRI 12/2020\par \par IMPRESSION:\par \par 1. No suspicious prostatic lesion.\par \par 2. Nonspecific geographic areas of linear and wedge-shaped signal abnormality within the peripheral zone, without corresponding diffusion or perfusion abnormality, possibly reflecting sequela of chronic prostatitis and/or fibrosis. PI-RADS 2.\par \par 3. Mild BPH.\par \par MRI 7/22/2019 \par 55 grams \par no hemorrhage\par BPH yes\par no lesions PIRADS 2\par \par MRI 9/8/2017 - 50 gram prostate\par pirads 3 \par oncotype - insufficient  volume of cancer to assess\par \par reports no new complaints [Urinary Incontinence] : no urinary incontinence [Urinary Retention] : no urinary retention [Urinary Urgency] : urinary urgency [Urinary Frequency] : urinary frequency [Erectile Dysfunction] : Erectile Dysfunction [None] : None

## 2022-01-24 NOTE — ASSESSMENT
[FreeTextEntry1] : 65 yo with low risk, low volume prostate cancer\par \par MRI 1/2022\par PIRADS 2\par 59 gram prostate\par \par MRI 12/2020\par \par IMPRESSION:\par \par 1. No suspicious prostatic lesion.\par \par 2. Nonspecific geographic areas of linear and wedge-shaped signal abnormality within the peripheral zone, without corresponding diffusion or perfusion abnormality, possibly reflecting sequela of chronic prostatitis and/or fibrosis. PI-RADS 2.\par \par 3. Mild BPH.\par \par PSA 1/2022      - 5.95 ng/ml\par PSA 7/08/2021 - 4.33 ng/ml 18% free\par PSA 1/07/2021 - 4.04 ng/ml 17% free\par PSA 6/30/2020 - 4.21 with 17% free\par PSA 1/22/20    - 3.91 with 17% free\par PSA 7/11/2019 - 4.83 with 17% free\par PSA 4/8/2019 - 4.12 with 15% free \par PSA 9/24/2018 - 3.85\par PSA 9/1/2017 - 3.11\par \par - PSA in 6 months\par \par \par discussed the findings in detail \par reviewed the MRI in detail\par discussed again the treatment options in detail\par all questions answered\par \par

## 2022-02-18 ENCOUNTER — EMERGENCY (EMERGENCY)
Facility: HOSPITAL | Age: 67
LOS: 0 days | Discharge: HOME | End: 2022-02-18
Attending: EMERGENCY MEDICINE | Admitting: EMERGENCY MEDICINE
Payer: COMMERCIAL

## 2022-02-18 VITALS
TEMPERATURE: 98 F | HEART RATE: 79 BPM | OXYGEN SATURATION: 98 % | SYSTOLIC BLOOD PRESSURE: 153 MMHG | RESPIRATION RATE: 18 BRPM | HEIGHT: 68 IN | DIASTOLIC BLOOD PRESSURE: 96 MMHG | WEIGHT: 205.03 LBS
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
## 2022-02-18 DIAGNOSIS — I10 ESSENTIAL (PRIMARY) HYPERTENSION: ICD-10-CM
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
## 2022-02-18 DIAGNOSIS — M54.9 DORSALGIA, UNSPECIFIED: ICD-10-CM

## 2022-02-18 DIAGNOSIS — E78.00 PURE HYPERCHOLESTEROLEMIA, UNSPECIFIED: ICD-10-CM
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
## 2022-02-18 LAB
ALBUMIN SERPL ELPH-MCNC: 4.9 G/DL — SIGNIFICANT CHANGE UP (ref 3.5–5.2)
ALP SERPL-CCNC: 64 U/L — SIGNIFICANT CHANGE UP (ref 30–115)
ALT FLD-CCNC: 23 U/L — SIGNIFICANT CHANGE UP (ref 0–41)
ANION GAP SERPL CALC-SCNC: 11 MMOL/L — SIGNIFICANT CHANGE UP (ref 7–14)
AST SERPL-CCNC: 25 U/L — SIGNIFICANT CHANGE UP (ref 0–41)
BASOPHILS # BLD AUTO: 0.02 K/UL — SIGNIFICANT CHANGE UP (ref 0–0.2)
BASOPHILS NFR BLD AUTO: 0.3 % — SIGNIFICANT CHANGE UP (ref 0–1)
BILIRUB SERPL-MCNC: 0.5 MG/DL — SIGNIFICANT CHANGE UP (ref 0.2–1.2)
BUN SERPL-MCNC: 15 MG/DL — SIGNIFICANT CHANGE UP (ref 10–20)
CALCIUM SERPL-MCNC: 9.6 MG/DL — SIGNIFICANT CHANGE UP (ref 8.5–10.1)
CHLORIDE SERPL-SCNC: 100 MMOL/L — SIGNIFICANT CHANGE UP (ref 98–110)
CO2 SERPL-SCNC: 27 MMOL/L — SIGNIFICANT CHANGE UP (ref 17–32)
CREAT SERPL-MCNC: 1 MG/DL — SIGNIFICANT CHANGE UP (ref 0.7–1.5)
D DIMER BLD IA.RAPID-MCNC: 61 NG/ML DDU — SIGNIFICANT CHANGE UP (ref 0–230)
EOSINOPHIL # BLD AUTO: 0.24 K/UL — SIGNIFICANT CHANGE UP (ref 0–0.7)
EOSINOPHIL NFR BLD AUTO: 3.1 % — SIGNIFICANT CHANGE UP (ref 0–8)
GLUCOSE SERPL-MCNC: 94 MG/DL — SIGNIFICANT CHANGE UP (ref 70–99)
HCT VFR BLD CALC: 43.6 % — SIGNIFICANT CHANGE UP (ref 42–52)
HGB BLD-MCNC: 14.8 G/DL — SIGNIFICANT CHANGE UP (ref 14–18)
IMM GRANULOCYTES NFR BLD AUTO: 0.4 % — HIGH (ref 0.1–0.3)
LYMPHOCYTES # BLD AUTO: 2.39 K/UL — SIGNIFICANT CHANGE UP (ref 1.2–3.4)
LYMPHOCYTES # BLD AUTO: 31.3 % — SIGNIFICANT CHANGE UP (ref 20.5–51.1)
MCHC RBC-ENTMCNC: 28.4 PG — SIGNIFICANT CHANGE UP (ref 27–31)
MCHC RBC-ENTMCNC: 33.9 G/DL — SIGNIFICANT CHANGE UP (ref 32–37)
MCV RBC AUTO: 83.5 FL — SIGNIFICANT CHANGE UP (ref 80–94)
MONOCYTES # BLD AUTO: 0.64 K/UL — HIGH (ref 0.1–0.6)
MONOCYTES NFR BLD AUTO: 8.4 % — SIGNIFICANT CHANGE UP (ref 1.7–9.3)
NEUTROPHILS # BLD AUTO: 4.32 K/UL — SIGNIFICANT CHANGE UP (ref 1.4–6.5)
NEUTROPHILS NFR BLD AUTO: 56.5 % — SIGNIFICANT CHANGE UP (ref 42.2–75.2)
NRBC # BLD: 0 /100 WBCS — SIGNIFICANT CHANGE UP (ref 0–0)
PLATELET # BLD AUTO: 239 K/UL — SIGNIFICANT CHANGE UP (ref 130–400)
POTASSIUM SERPL-MCNC: 4.4 MMOL/L — SIGNIFICANT CHANGE UP (ref 3.5–5)
POTASSIUM SERPL-SCNC: 4.4 MMOL/L — SIGNIFICANT CHANGE UP (ref 3.5–5)
PROT SERPL-MCNC: 7.6 G/DL — SIGNIFICANT CHANGE UP (ref 6–8)
RBC # BLD: 5.22 M/UL — SIGNIFICANT CHANGE UP (ref 4.7–6.1)
RBC # FLD: 13.7 % — SIGNIFICANT CHANGE UP (ref 11.5–14.5)
SODIUM SERPL-SCNC: 138 MMOL/L — SIGNIFICANT CHANGE UP (ref 135–146)
TROPONIN T SERPL-MCNC: <0.01 NG/ML — SIGNIFICANT CHANGE UP
WBC # BLD: 7.64 K/UL — SIGNIFICANT CHANGE UP (ref 4.8–10.8)
WBC # FLD AUTO: 7.64 K/UL — SIGNIFICANT CHANGE UP (ref 4.8–10.8)

## 2022-02-18 PROCEDURE — 99285 EMERGENCY DEPT VISIT HI MDM: CPT

## 2022-02-18 PROCEDURE — 93010 ELECTROCARDIOGRAM REPORT: CPT

## 2022-02-18 PROCEDURE — 71046 X-RAY EXAM CHEST 2 VIEWS: CPT | Mod: 26

## 2022-02-18 NOTE — ED PROVIDER NOTE - PATIENT PORTAL LINK FT
You can access the FollowMyHealth Patient Portal offered by [facility] by registering at the following website: [email or web address]. By joining [employer]’s FollowMyHealth portal, you will also be able to view your health information using other applications (apps) compatible with our system.

## 2022-02-18 NOTE — ED PROVIDER NOTE - NSFOLLOWUPCLINICS_GEN_ALL_ED_FT
[facility] ([facility])  Rehabilitation  [facility] [address] [place] [address]  Phone: [phone]  Fax:   Follow Up Time: 1-3 Days    [facility] ([facility])  Rehabilitation  [address] [place] [id number]  Phone: [phone]  Fax:   Follow Up Time: 1-3 Days

## 2022-02-18 NOTE — ED PROVIDER NOTE - ATTENDING CONTRIBUTION TO CARE
66-year-old male with past medical history of hypertension presents with mid back pain.  Patient says started 3 weeks ago after shoveling snow.  Patient has been taking Motrin and Robaxin with some relief.  Patient says he is concerned that the pain has lasted so long.  Patient admits pleuritic back pain and worsen on position changes.  Patient denies leg swelling leg pain or shortness of breath or chest pain.  Exam: nad, ncat, perrl, eomi, mmm, rrr, ctab, abd soft, nt, nd aox3, no calf tenderness, no pitting edema, back nontender impression: Patient with pleuritic back pain for 3 weeks, will check labs and D-dimer EKG chest x-ray.  Patient is comfortable right now and does not require any pain medication

## 2022-02-18 NOTE — ED ADULT NURSE NOTE - NSIMPLEMENTINTERV_GEN_ALL_ED
Implemented All Universal Safety Interventions:  [place] to call system. Call bell, personal items and telephone within reach. Instruct patient to call for assistance. Room bathroom lighting operational. Non-slip footwear when patient is off stretcher. Physically safe environment: no spills, clutter or unnecessary equipment. Stretcher in lowest position, wheels locked, appropriate side rails in place.

## 2022-02-18 NOTE — ED PROVIDER NOTE - NSFOLLOWUPINSTRUCTIONS_ED_ALL_ED_FT
Follow up with your primary medical doctor as well as with rehab    Back Pain    WHAT YOU NEED TO KNOW:    Back pain is common. It can be caused by many conditions, such as arthritis or the breakdown of spinal discs. Your risk for back pain is increased by injuries, lack of activity, or repeated bending and twisting. You may feel sore or stiff on one or both sides of your back. The pain may spread to your buttocks or thighs.    DISCHARGE INSTRUCTIONS:    Return to the emergency department if:     You have pain, numbness, or weakness in one or both legs.      Your pain becomes so severe that you cannot walk.      You cannot control your urine or bowel movements.      You have severe back pain with chest pain.      You have severe back pain, nausea, and vomiting.      You have severe back pain that spreads to your side or genital area.    Contact your healthcare provider if:     You have back pain that does not get better with rest and pain medicine.      You have a fever.      You have pain that worsens when you are on your back or when you rest.      You have pain that worsens when you cough or sneeze.      You lose weight without trying.      You have questions or concerns about your condition or care.    Medicines:     NSAIDs help decrease swelling and pain. This medicine is available with or without a doctor's order. NSAIDs can cause stomach bleeding or kidney problems in certain people. If you take blood thinner medicine, always ask your healthcare provider if NSAIDs are safe for you. Always read the medicine label and follow directions.      Acetaminophen decreases pain and fever. It is available without a doctor's order. Ask how much to take and how often to take it. Follow directions. Read the labels of all other medicines you are using to see if they also contain acetaminophen, or ask your doctor or pharmacist. Acetaminophen can cause liver damage if not taken correctly. Do not use more than 4 grams (4,000 milligrams) total of acetaminophen in one day.       Muscle relaxers help decrease muscle spasms and back pain.      Prescription pain medicine may be given. Ask your healthcare provider how to take this medicine safely. Some prescription pain medicines contain acetaminophen. Do not take other medicines that contain acetaminophen without talking to your healthcare provider. Too much acetaminophen may cause liver damage. Prescription pain medicine may cause constipation. Ask your healthcare provider how to prevent or treat constipation.       Take your medicine as directed. Contact your healthcare provider if you think your medicine is not helping or if you have side effects. Tell him or her if you are allergic to any medicine. Keep a list of the medicines, vitamins, and herbs you take. Include the amounts, and when and why you take them. Bring the list or the pill bottles to follow-up visits. Carry your medicine list with you in case of an emergency.    How to manage your back pain:     Apply ice on your back for 15 to 20 minutes every hour or as directed. Use an ice pack, or put crushed ice in a plastic bag. Cover it with a towel before you apply it to your skin. Ice helps prevent tissue damage and decreases pain.      Apply heat on your back for 20 to 30 minutes every 2 hours for as many days as directed. Heat helps decrease pain and muscle spasms.      Stay active as much as you can without causing more pain. Bed rest could make your back pain worse. Avoid heavy lifting until your pain is gone.      Go to physical therapy as directed. A physical therapist can teach you exercises to help improve movement and strength, and to decrease pain.    Follow up with your healthcare provider in 2 weeks, or as directed: Write down your questions so you remember to ask them during your visits.       © Copyright [employer] 2019 All illustrations and images included in CareNotes are the copyrighted property of [employer] or [employer].

## 2022-02-18 NOTE — ED ADULT NURSE NOTE - OBJECTIVE STATEMENT
Pt with C/O middle back pain on and off for 3 weeks after shoveling the snow ,pt denies fever or chills no N/V no dizziness .

## 2022-02-18 NOTE — ED PROVIDER NOTE - PHYSICAL EXAMINATION
Physical Exam    Vital Signs: I have reviewed the initial vital signs.  Constitutional: well-nourished, appears stated age, no acute distress  Eyes: Conjunctiva pink, Sclera clear, PERRLA, EOMI.  Cardiovascular: S1 and S2, regular rate, regular rhythm, well-perfused extremities, radial pulses equal and 2+  Respiratory: unlabored respiratory effort, clear to auscultation bilaterally no wheezing, rales and rhonchi  Gastrointestinal: soft, non-tender abdomen, no pulsatile mass, normal bowl sounds  Musculoskeletal: supple neck, no lower extremity edema, no midline tenderness. no TTP of the area of complaint, no rashes, no evidence of trauma.   Integumentary: warm, dry, no rash  Neurologic: awake, alert, cranial nerves II-XII grossly intact, extremities’ motor and sensory functions grossly intact  Psychiatric: appropriate mood, appropriate affect Physical Exam    Vital Signs: I have reviewed the initial vital signs.  Constitutional: well-nourished, appears stated age, no acute distress  Eyes: Conjunctiva pink, Sclera clear, PERRLA, EOMI.  Cardiovascular: S1 and S2, regular rate, regular rhythm, well-perfused extremities, radial pulses equal and 2+  Respiratory: unlabored respiratory effort, clear to auscultation bilaterally no wheezing, rales and rhonchi  Gastrointestinal: soft, non-tender abdomen, no pulsatile mass, normal bowl sounds. no CVAT  Musculoskeletal: supple neck, no lower extremity edema, no midline tenderness. no TTP of the area of complaint, no rashes, no evidence of trauma.   Integumentary: warm, dry, no rash  Neurologic: awake, alert, cranial nerves II-XII grossly intact, extremities’ motor and sensory functions grossly intact  Psychiatric: appropriate mood, appropriate affect

## 2022-02-18 NOTE — ED PROVIDER NOTE - OBJECTIVE STATEMENT
Pt is a 66 male with PMH HTN presents to ED with complaints of back pain. Pt states 3 weeks prior started having back pain, initially thought it was s/p shoveling. Pt states has been taking 800mg motrin q8h as well as Robaxin, with some relief. Pt states pain is located to the L thoracic region under scapula, mild-moderate, non radiating with aggravation with deep inspiration, at times with movement. Pt denies any fever, chills, bodyaches, cough, chest pain, sob, abdominal pain, NVCD. no cardiac hx, no hx of DVT or PE. non smoker and non drinker. Pt is a 66 male with PMH HTN presents to ED with complaints of back pain. Pt states 3 weeks prior started having back pain, initially thought it was s/p shoveling. Pt states has been taking 800mg motrin q8h as well as Robaxin, with some relief. Pt states pain is located to the L thoracic region under scapula, mild-moderate, non radiating with aggravation with deep inspiration, at times with movement. Pt denies any fever, chills, bodyaches, cough, chest pain, sob, abdominal pain, NVCD. no cardiac hx, no hx of DVT or PE. non smoker and non drinker. no dysuria or hematuria

## 2022-04-13 RX ORDER — SILDENAFIL 20 MG/1
20 TABLET ORAL
Qty: 30 | Refills: 3 | Status: ACTIVE | COMMUNITY
Start: 2018-10-08 | End: 1900-01-01

## 2022-06-16 NOTE — ED PROCEDURE NOTE - CPROC ED LACERATION CLEANSED1
Pt complaining of intermittent  Generalized abdominal pain with bilateral flank pain for several years. Pt also complaining of constipation. Denies vomiting/nausea. Fevers.  
cleansed

## 2022-07-29 LAB
PSA FREE FLD-MCNC: 24 %
PSA FREE SERPL-MCNC: 1.81 NG/ML
PSA SERPL-MCNC: 7.5 NG/ML

## 2022-08-04 ENCOUNTER — APPOINTMENT (OUTPATIENT)
Dept: UROLOGY | Facility: CLINIC | Age: 67
End: 2022-08-04

## 2022-08-04 PROCEDURE — 99214 OFFICE O/P EST MOD 30 MIN: CPT

## 2022-08-04 NOTE — PHYSICAL EXAM
[General Appearance - In No Acute Distress] : no acute distress [Prostate Tenderness] : the prostate was not tender [No Prostate Nodules] : no prostate nodules [Prostate Size ___ gm] : prostate size [unfilled] gm [] : no respiratory distress [Oriented To Time, Place, And Person] : oriented to person, place, and time [Normal Station and Gait] : the gait and station were normal for the patient's age [No Focal Deficits] : no focal deficits

## 2022-08-07 NOTE — ADDENDUM
[FreeTextEntry1] : Documented by CHARLEE Good acting as a scribe for Dr. Harpreet Baker \par \par All medical record entries made by the Scribe were at my, Dr. Baker direction and\par personally dictated by me.  I have reviewed the chart and agree that the record\par accurately reflects my personal performance of the history, physical exam, procedure and imaging.  \par  \par \par

## 2022-08-07 NOTE — ASSESSMENT
[FreeTextEntry1] : 67-year-old with low volume low risk prostate cancer.\par PSA is elevated from baseline.  Most recent 7.5 ng/mL.\par Negative MRI in January 2022.  No nodules on BRINDA today.\par \par Recommend repeat PSA in September 2022 (3 months from last PSA) and follow-up for review.

## 2022-08-07 NOTE — HISTORY OF PRESENT ILLNESS
[FreeTextEntry1] : 67-year-old with low volume Karla 3+3 and 2% of 1 core in the mid base right side from prostate biopsy done 2017.\par \par Patient presents to office today to review his most recent PSA.\par PSA 07/2022- 7.5 ng/mL with 24 % free\par PSA 01/2022 - 5.95 ng/ml \par PSA 7/08/2021 - 4.33 ng/ml 18% free\par PSA 1/07/2021 - 4.04 ng/ml 17% free\par PSA 6/30/2020 - 4.21 with 17% free\par PSA 1/22/20 - 3.91 with 17% free\par PSA 7/11/2019 - 4.83 with 17% free\par PSA 4/8/2019 - 4.12 with 15% free \par PSA 9/24/2018 - 3.85\par PSA 9/1/2017 - 3.11\par \par MRI 1/2022\par PIRADS 2\par 59 gram prostate\par PSA density 0.07 ng/ml\par \par MRI 12/2020\par \par IMPRESSION:\par \par 1. No suspicious prostatic lesion.\par \par 2. Nonspecific geographic areas of linear and wedge-shaped signal abnormality within the peripheral zone, without corresponding diffusion or perfusion abnormality, possibly reflecting sequela of chronic prostatitis and/or fibrosis. PI-RADS 2.\par \par 3. Mild BPH.\par \par MRI 7/22/2019 \par 55 grams \par no hemorrhage\par BPH yes\par no lesions PIRADS 2\par \par MRI 9/8/2017 - 50 gram prostate\par pirads 3 \par oncotype - insufficient volume of cancer to assess\par \par reports no new complaints

## 2022-09-12 LAB
PSA FREE FLD-MCNC: 19 %
PSA FREE SERPL-MCNC: 0.95 NG/ML
PSA SERPL-MCNC: 4.99 NG/ML

## 2022-09-18 ENCOUNTER — NON-APPOINTMENT (OUTPATIENT)
Age: 67
End: 2022-09-18

## 2022-09-22 ENCOUNTER — APPOINTMENT (OUTPATIENT)
Dept: UROLOGY | Facility: CLINIC | Age: 67
End: 2022-09-22

## 2022-09-22 PROCEDURE — 99214 OFFICE O/P EST MOD 30 MIN: CPT

## 2022-09-22 NOTE — ASSESSMENT
[FreeTextEntry1] : 67-year-old with low volume low risk prostate cancer.\par PSA has improved to 4.99 ng/mL. \par \par Plan\par -MRI of prostate 01/2023\par -Follow up 6 months\par -PSA prior \par -Follow up sooner if MRI is abnormal.

## 2022-09-22 NOTE — HISTORY OF PRESENT ILLNESS
[FreeTextEntry1] : 67-year-old with low volume Kalra 3+3 and 2% of 1 core in the mid base right side from prostate biopsy done 2017.\par \par Patient reports feeling well. Denies dysuria and gross hematuria. \par \par Patient presents to office today to review his most recent PSA.\par PSA 09/2022- 4.99 ng/mL with 19% free\par PSA 07/2022- 7.5 ng/mL with 24 % free\par PSA 01/2022 - 5.95 ng/ml \par PSA 7/08/2021 - 4.33 ng/ml 18% free\par PSA 1/07/2021 - 4.04 ng/ml 17% free\par PSA 6/30/2020 - 4.21 with 17% free\par PSA 1/22/20 - 3.91 with 17% free\par PSA 7/11/2019 - 4.83 with 17% free\par PSA 4/8/2019 - 4.12 with 15% free \par PSA 9/24/2018 - 3.85\par PSA 9/1/2017 - 3.11\par \par \par MRI 1/2022\par PIRADS 2\par 59 gram prostate\par PSA density 0.07 ng/ml\par \par MRI 12/2020\par \par IMPRESSION:\par \par 1. No suspicious prostatic lesion.\par \par 2. Nonspecific geographic areas of linear and wedge-shaped signal abnormality within the peripheral zone, without corresponding diffusion or perfusion abnormality, possibly reflecting sequela of chronic prostatitis and/or fibrosis. PI-RADS 2.\par \par 3. Mild BPH.\par \par MRI 7/22/2019 \par 55 grams \par no hemorrhage\par BPH yes\par no lesions PIRADS 2\par \par MRI 9/8/2017 - 50 gram prostate\par pirads 3 \par oncotype - insufficient volume of cancer to assess\par \par reports no new complaints \par

## 2023-01-25 NOTE — ED PROCEDURE NOTE - CPROC ED SITE PREP1
Requesting medication refill. Please approve or deny this request.    Rx requested:  Requested Prescriptions     Pending Prescriptions Disp Refills    JARDIANCE 10 MG tablet [Pharmacy Med Name: Jardiance 10 mg tablet] 30 tablet 5     Sig: Take 1 tablet by mouth daily    carvedilol (COREG) 12.5 MG tablet [Pharmacy Med Name: carvedilol 12.5 mg tablet] 60 tablet 5     Sig: TAKE 1 TABLET BY MOUTH 2 TIMES DAILY         Last Office Visit:   10/26/2022      Next Visit Date:  Future Appointments   Date Time Provider Rosaline Silveira   1/25/2023 11:00 AM Praneeth Stuart [facility] EMERGENCY [facility] [employer]   3/15/2023  4:00 PM MD Angelic Yost [facility] -   3/17/2023 11:00 AM Javier Tariq [facility]                Please approve or deny.
normal saline

## 2023-03-15 LAB
PSA FREE FLD-MCNC: 16 %
PSA FREE SERPL-MCNC: 1.12 NG/ML
PSA SERPL-MCNC: 6.8 NG/ML

## 2023-03-17 ENCOUNTER — NON-APPOINTMENT (OUTPATIENT)
Age: 68
End: 2023-03-17

## 2023-04-17 ENCOUNTER — APPOINTMENT (OUTPATIENT)
Dept: UROLOGY | Facility: CLINIC | Age: 68
End: 2023-04-17
Payer: COMMERCIAL

## 2023-04-17 PROCEDURE — 99214 OFFICE O/P EST MOD 30 MIN: CPT

## 2023-04-17 RX ORDER — TADALAFIL 5 MG/1
5 TABLET ORAL
Qty: 30 | Refills: 5 | Status: ACTIVE | COMMUNITY
Start: 2023-04-17 | End: 1900-01-01

## 2023-04-19 NOTE — ASSESSMENT
[FreeTextEntry1] : 67-year-old with low volume low risk prostate cancer.\par \par PSA reviewed 6.8 ng/ml . \par \par Plan\par - MRI of prostate reviewed\par - Dr. Tovar for MRI guided biopsy\par - all questions answered

## 2023-04-19 NOTE — HISTORY OF PRESENT ILLNESS
[FreeTextEntry1] : 67 year old with low volume Karla 3+3 and 2% of 1 core in the mid base right side from prostate biopsy done 2017.\par \par Patient presents to office today to review his most recent PSA.\par \par PSA 03/2023 - 6.8- ng/ml with 16% free \par PSA 09/2022- 4.99 ng/mL with 19% free\par PSA 07/2022- 7.5 ng/mL with 24 % free\par PSA 01/2022 - 5.95 ng/ml \par PSA 7/08/2021 - 4.33 ng/ml 18% free\par PSA 1/07/2021 - 4.04 ng/ml 17% free\par PSA 6/30/2020 - 4.21 with 17% free\par PSA 1/22/20 - 3.91 with 17% free\par PSA 7/11/2019 - 4.83 with 17% free\par PSA 4/8/2019 - 4.12 with 15% free \par PSA 9/24/2018 - 3.85\par PSA 9/1/2017 - 3.11\par \par MRI prostate 4/3/2023\par \par 6 x 5 mm PIRADS 3 lesion - left posteromedial\par 7 x 7 mm PIRADS 2 lesion - right posterolateral\par \par diffuse hypointensity and avid enhancement in the peripheral zone bilaterally - possibly inflammatory\par \par \par MRI 1/2022\par PIRADS 2\par 59 gram prostate\par PSA density 0.07 ng/ml\par \par MRI 12/2020\par \par IMPRESSION:\par \par 1. No suspicious prostatic lesion.\par \par 2. Nonspecific geographic areas of linear and wedge-shaped signal abnormality within the peripheral zone, without corresponding diffusion or perfusion abnormality, possibly reflecting sequela of chronic prostatitis and/or fibrosis. PI-RADS 2.\par \par 3. Mild BPH.\par \par MRI 7/22/2019 \par 55 grams \par no hemorrhage\par BPH yes\par no lesions PIRADS 2\par \par MRI 9/8/2017 - 50 gram prostate\par pirads 3 \par oncotype - insufficient volume of cancer to assess\par \par reports no new complaints \par

## 2023-05-24 ENCOUNTER — APPOINTMENT (OUTPATIENT)
Dept: UROLOGY | Facility: CLINIC | Age: 68
End: 2023-05-24
Payer: COMMERCIAL

## 2023-05-24 VITALS
RESPIRATION RATE: 18 BRPM | HEART RATE: 91 BPM | SYSTOLIC BLOOD PRESSURE: 135 MMHG | DIASTOLIC BLOOD PRESSURE: 89 MMHG | BODY MASS INDEX: 31.07 KG/M2 | WEIGHT: 205 LBS | HEIGHT: 68 IN | OXYGEN SATURATION: 99 % | TEMPERATURE: 98.3 F

## 2023-05-24 DIAGNOSIS — R97.20 ELEVATED PROSTATE, SPECIFIC ANTIGEN [PSA]: ICD-10-CM

## 2023-05-24 PROCEDURE — 99214 OFFICE O/P EST MOD 30 MIN: CPT

## 2023-05-24 NOTE — HISTORY OF PRESENT ILLNESS
[FreeTextEntry1] : 67-year-old with very low risk prostate cancer on active surveillance.  It was diagnosed in 2017 when his PSA was 3.1.  Now PSA is 6.8.  It was as high as 7.5 in 2022.  Recent MRI shows a PI-RADS 3 lesion in the left posterior medial prostate

## 2023-05-24 NOTE — ASSESSMENT
[FreeTextEntry1] : Patient with low risk prostate cancer on active surveillance with rising PSA and a new PI-RADS 3 lesion on MRI.  Risk benefits and alternatives fully discussed with patient regarding transperineal fusion biopsy.  Including discussion are pain, infection, bleeding, urinary retention.  He understands in my hands this will be done under brief general anesthesia.  Alternative is just to continue monitoring PSA.  Rationale for biopsy is to check for any progression in volume of cancer or grade.  Patient agrees to proceed with biopsy [Urinary Symptom or Sign (788.99\R39.89)] : implantation

## 2023-06-16 ENCOUNTER — OUTPATIENT (OUTPATIENT)
Dept: OUTPATIENT SERVICES | Facility: HOSPITAL | Age: 68
LOS: 1 days | End: 2023-06-16
Payer: COMMERCIAL

## 2023-06-16 ENCOUNTER — RESULT REVIEW (OUTPATIENT)
Age: 68
End: 2023-06-16

## 2023-06-16 VITALS
WEIGHT: 207.23 LBS | HEIGHT: 69 IN | TEMPERATURE: 98 F | DIASTOLIC BLOOD PRESSURE: 80 MMHG | SYSTOLIC BLOOD PRESSURE: 140 MMHG | HEART RATE: 72 BPM | RESPIRATION RATE: 20 BRPM | OXYGEN SATURATION: 100 %

## 2023-06-16 DIAGNOSIS — Z90.89 ACQUIRED ABSENCE OF OTHER ORGANS: Chronic | ICD-10-CM

## 2023-06-16 DIAGNOSIS — R97.20 ELEVATED PROSTATE SPECIFIC ANTIGEN [PSA]: ICD-10-CM

## 2023-06-16 DIAGNOSIS — Z98.890 OTHER SPECIFIED POSTPROCEDURAL STATES: Chronic | ICD-10-CM

## 2023-06-16 DIAGNOSIS — Z01.818 ENCOUNTER FOR OTHER PREPROCEDURAL EXAMINATION: ICD-10-CM

## 2023-06-16 LAB
ALBUMIN SERPL ELPH-MCNC: 5.1 G/DL — SIGNIFICANT CHANGE UP (ref 3.5–5.2)
ALP SERPL-CCNC: 68 U/L — SIGNIFICANT CHANGE UP (ref 30–115)
ALT FLD-CCNC: 23 U/L — SIGNIFICANT CHANGE UP (ref 0–41)
ANION GAP SERPL CALC-SCNC: 14 MMOL/L — SIGNIFICANT CHANGE UP (ref 7–14)
APPEARANCE UR: CLEAR — SIGNIFICANT CHANGE UP
APTT BLD: 32.3 SEC — SIGNIFICANT CHANGE UP (ref 27–39.2)
AST SERPL-CCNC: 27 U/L — SIGNIFICANT CHANGE UP (ref 0–41)
BASOPHILS # BLD AUTO: 0.01 K/UL — SIGNIFICANT CHANGE UP (ref 0–0.2)
BASOPHILS NFR BLD AUTO: 0.1 % — SIGNIFICANT CHANGE UP (ref 0–1)
BILIRUB SERPL-MCNC: 0.7 MG/DL — SIGNIFICANT CHANGE UP (ref 0.2–1.2)
BILIRUB UR-MCNC: NEGATIVE — SIGNIFICANT CHANGE UP
BUN SERPL-MCNC: 14 MG/DL — SIGNIFICANT CHANGE UP (ref 10–20)
CALCIUM SERPL-MCNC: 9.7 MG/DL — SIGNIFICANT CHANGE UP (ref 8.4–10.5)
CHLORIDE SERPL-SCNC: 100 MMOL/L — SIGNIFICANT CHANGE UP (ref 98–110)
CO2 SERPL-SCNC: 24 MMOL/L — SIGNIFICANT CHANGE UP (ref 17–32)
COLOR SPEC: SIGNIFICANT CHANGE UP
CREAT SERPL-MCNC: 0.9 MG/DL — SIGNIFICANT CHANGE UP (ref 0.7–1.5)
DIFF PNL FLD: NEGATIVE — SIGNIFICANT CHANGE UP
EGFR: 93 ML/MIN/1.73M2 — SIGNIFICANT CHANGE UP
EOSINOPHIL # BLD AUTO: 0.15 K/UL — SIGNIFICANT CHANGE UP (ref 0–0.7)
EOSINOPHIL NFR BLD AUTO: 2.2 % — SIGNIFICANT CHANGE UP (ref 0–8)
GLUCOSE SERPL-MCNC: 92 MG/DL — SIGNIFICANT CHANGE UP (ref 70–99)
GLUCOSE UR QL: NEGATIVE — SIGNIFICANT CHANGE UP
HCT VFR BLD CALC: 43.7 % — SIGNIFICANT CHANGE UP (ref 42–52)
HGB BLD-MCNC: 15.4 G/DL — SIGNIFICANT CHANGE UP (ref 14–18)
IMM GRANULOCYTES NFR BLD AUTO: 0.3 % — SIGNIFICANT CHANGE UP (ref 0.1–0.3)
INR BLD: 0.96 RATIO — SIGNIFICANT CHANGE UP (ref 0.65–1.3)
KETONES UR-MCNC: NEGATIVE — SIGNIFICANT CHANGE UP
LEUKOCYTE ESTERASE UR-ACNC: NEGATIVE — SIGNIFICANT CHANGE UP
LYMPHOCYTES # BLD AUTO: 2.19 K/UL — SIGNIFICANT CHANGE UP (ref 1.2–3.4)
LYMPHOCYTES # BLD AUTO: 32.7 % — SIGNIFICANT CHANGE UP (ref 20.5–51.1)
MCHC RBC-ENTMCNC: 29.1 PG — SIGNIFICANT CHANGE UP (ref 27–31)
MCHC RBC-ENTMCNC: 35.2 G/DL — SIGNIFICANT CHANGE UP (ref 32–37)
MCV RBC AUTO: 82.5 FL — SIGNIFICANT CHANGE UP (ref 80–94)
MONOCYTES # BLD AUTO: 0.53 K/UL — SIGNIFICANT CHANGE UP (ref 0.1–0.6)
MONOCYTES NFR BLD AUTO: 7.9 % — SIGNIFICANT CHANGE UP (ref 1.7–9.3)
NEUTROPHILS # BLD AUTO: 3.8 K/UL — SIGNIFICANT CHANGE UP (ref 1.4–6.5)
NEUTROPHILS NFR BLD AUTO: 56.8 % — SIGNIFICANT CHANGE UP (ref 42.2–75.2)
NITRITE UR-MCNC: NEGATIVE — SIGNIFICANT CHANGE UP
NRBC # BLD: 0 /100 WBCS — SIGNIFICANT CHANGE UP (ref 0–0)
PH UR: 6.5 — SIGNIFICANT CHANGE UP (ref 5–8)
PLATELET # BLD AUTO: 259 K/UL — SIGNIFICANT CHANGE UP (ref 130–400)
PMV BLD: 10.2 FL — SIGNIFICANT CHANGE UP (ref 7.4–10.4)
POTASSIUM SERPL-MCNC: 4.3 MMOL/L — SIGNIFICANT CHANGE UP (ref 3.5–5)
POTASSIUM SERPL-SCNC: 4.3 MMOL/L — SIGNIFICANT CHANGE UP (ref 3.5–5)
PROT SERPL-MCNC: 7.4 G/DL — SIGNIFICANT CHANGE UP (ref 6–8)
PROT UR-MCNC: SIGNIFICANT CHANGE UP
PROTHROM AB SERPL-ACNC: 10.9 SEC — SIGNIFICANT CHANGE UP (ref 9.95–12.87)
RBC # BLD: 5.3 M/UL — SIGNIFICANT CHANGE UP (ref 4.7–6.1)
RBC # FLD: 14.1 % — SIGNIFICANT CHANGE UP (ref 11.5–14.5)
SODIUM SERPL-SCNC: 138 MMOL/L — SIGNIFICANT CHANGE UP (ref 135–146)
SP GR SPEC: 1.02 — SIGNIFICANT CHANGE UP (ref 1.01–1.03)
UROBILINOGEN FLD QL: SIGNIFICANT CHANGE UP
WBC # BLD: 6.7 K/UL — SIGNIFICANT CHANGE UP (ref 4.8–10.8)
WBC # FLD AUTO: 6.7 K/UL — SIGNIFICANT CHANGE UP (ref 4.8–10.8)

## 2023-06-16 PROCEDURE — 81003 URINALYSIS AUTO W/O SCOPE: CPT

## 2023-06-16 PROCEDURE — 71046 X-RAY EXAM CHEST 2 VIEWS: CPT

## 2023-06-16 PROCEDURE — 85610 PROTHROMBIN TIME: CPT

## 2023-06-16 PROCEDURE — 93010 ELECTROCARDIOGRAM REPORT: CPT

## 2023-06-16 PROCEDURE — 87086 URINE CULTURE/COLONY COUNT: CPT

## 2023-06-16 PROCEDURE — 80053 COMPREHEN METABOLIC PANEL: CPT

## 2023-06-16 PROCEDURE — 99214 OFFICE O/P EST MOD 30 MIN: CPT | Mod: 25

## 2023-06-16 PROCEDURE — 85730 THROMBOPLASTIN TIME PARTIAL: CPT

## 2023-06-16 PROCEDURE — 36415 COLL VENOUS BLD VENIPUNCTURE: CPT

## 2023-06-16 PROCEDURE — 71046 X-RAY EXAM CHEST 2 VIEWS: CPT | Mod: 26

## 2023-06-16 PROCEDURE — 93005 ELECTROCARDIOGRAM TRACING: CPT

## 2023-06-16 PROCEDURE — 85025 COMPLETE CBC W/AUTO DIFF WBC: CPT

## 2023-06-16 RX ORDER — LOSARTAN POTASSIUM 100 MG/1
1 TABLET, FILM COATED ORAL
Qty: 0 | Refills: 0 | DISCHARGE

## 2023-06-16 NOTE — H&P PST ADULT - NSICDXFAMILYHX_GEN_ALL_CORE_FT
FAMILY HISTORY:  Father  Still living? Yes, Estimated age: Age Unknown  FH: CAD (coronary artery disease), Age at diagnosis: Age Unknown  FH: prostate cancer, Age at diagnosis: Age Unknown    Mother  Still living? Yes, Estimated age: Age Unknown  FH: diabetes mellitus, Age at diagnosis: Age Unknown

## 2023-06-16 NOTE — H&P PST ADULT - ATTENDING COMMENTS
For transperineal mr/us fusion biopsy prostate.  risks, benefits, alternatives discussed including pain, infection, bleeding, urinary retention

## 2023-06-16 NOTE — H&P PST ADULT - NS HP PST LATEX ALLERGY
Assessment/Plan:  Labs well as urinalysis reviewed at this time  EKG shows normal sinus rhythm  Patient status post dental work  Guidance given overall  Patient low risk for cardiopulmonary event  Okay to proceed with surgery  Patient will stop multivitamin as well as meloxicam 1 week prior to surgery  Patient will hold losartan the day of surgery  Diagnoses and all orders for this visit:    Preop cardiovascular exam    Localized osteoarthritis of right knee    Essential hypertension    Impaired fasting glucose    Mixed hyperlipidemia            Subjective:        Patient ID: Nico Stokes is a 71 y o  male  Patient is here for preop clearance requested by Dr Roe Cam for right total knee surgery to be done on February 16  Patient with swelling and pain over right knee  No chest pain shortness of breath syncope or dizziness noted  No fevers or chills or cough or URI symptoms  No significant change in urination or defecation at this time  No bleeding issues  No problems with anesthesia in the past   Patient did have laboratory studies done  The following portions of the patient's history were reviewed and updated as appropriate: allergies, current medications, past family history, past medical history, past social history, past surgical history and problem list       Review of Systems   Constitutional: Negative  HENT: Negative  Eyes: Negative  Respiratory: Negative  Cardiovascular: Negative  Gastrointestinal: Negative  Endocrine: Negative  Genitourinary: Negative  Musculoskeletal: Positive for arthralgias and gait problem  Skin: Negative  Allergic/Immunologic: Negative  Hematological: Negative  Psychiatric/Behavioral: Negative  Objective:      BMI Counseling: Body mass index is 27 95 kg/m²  The BMI is above normal  Nutrition recommendations include decreasing portion sizes  Exercise recommendations include exercising 3-5 times per week  Rationale for BMI follow-up plan is due to patient being overweight or obese  Depression Screening and Follow-up Plan: Clincally patient does not have depression  No treatment is required  /70 (BP Location: Left arm, Patient Position: Sitting, Cuff Size: Adult)   Pulse 80   Temp (!) 96 2 °F (35 7 °C) (Tympanic)   Ht 5' 11" (1 803 m)   Wt 90 9 kg (200 lb 6 4 oz)   SpO2 95%   BMI 27 95 kg/m²          Physical Exam  Vitals and nursing note reviewed  Constitutional:       General: He is not in acute distress  Appearance: Normal appearance  He is not ill-appearing, toxic-appearing or diaphoretic  HENT:      Head: Normocephalic and atraumatic  Right Ear: Tympanic membrane, ear canal and external ear normal  There is no impacted cerumen  Left Ear: Tympanic membrane, ear canal and external ear normal  There is no impacted cerumen  Nose: Nose normal  No congestion or rhinorrhea  Mouth/Throat:      Mouth: Mucous membranes are moist       Pharynx: No oropharyngeal exudate or posterior oropharyngeal erythema  Eyes:      General: No scleral icterus  Right eye: No discharge  Left eye: No discharge  Extraocular Movements: Extraocular movements intact  Conjunctiva/sclera: Conjunctivae normal       Pupils: Pupils are equal, round, and reactive to light  Neck:      Vascular: No carotid bruit  Cardiovascular:      Rate and Rhythm: Normal rate and regular rhythm  Pulses: Normal pulses  Heart sounds: Normal heart sounds  No murmur heard  No friction rub  No gallop  Pulmonary:      Effort: Pulmonary effort is normal  No respiratory distress  Breath sounds: Normal breath sounds  No stridor  No wheezing, rhonchi or rales  Chest:      Chest wall: No tenderness  Abdominal:      General: Abdomen is flat  Bowel sounds are normal  There is no distension  Palpations: Abdomen is soft  Tenderness:  There is no abdominal tenderness  There is no guarding or rebound  Musculoskeletal:         General: Tenderness present  No swelling, deformity or signs of injury  Cervical back: Normal range of motion and neck supple  No rigidity  No muscular tenderness  Right lower leg: No edema  Left lower leg: No edema  Comments: Osteoarthritic changes right knee   Lymphadenopathy:      Cervical: No cervical adenopathy  Skin:     General: Skin is warm and dry  Capillary Refill: Capillary refill takes less than 2 seconds  Coloration: Skin is not jaundiced  Findings: No bruising, erythema, lesion or rash  Neurological:      Mental Status: He is alert and oriented to person, place, and time  Mental status is at baseline  Cranial Nerves: No cranial nerve deficit  Sensory: No sensory deficit  Motor: No weakness  Coordination: Coordination normal       Gait: Gait normal    Psychiatric:         Mood and Affect: Mood normal          Behavior: Behavior normal          Thought Content:  Thought content normal          Judgment: Judgment normal  No

## 2023-06-16 NOTE — H&P PST ADULT - HISTORY OF PRESENT ILLNESS
Patient is a 67 year old male presenting to PAST in preparation for TRANSPERINEAL FUSION PROSTATE BIOPSY on 7/10 under gen anesthesia by Dr. Tovar  reports h/o increased PSA level has been advised to have above  PATIENT CURRENTLY DENIES CHEST PAIN  SHORTNESS OF BREATH  PALPITATIONS,  DYSURIA, OR UPPER RESPIRATORY INFECTION IN PAST 2 WEEKS  EXERCISE  TOLERANCE  1-2 FLIGHT OF STAIRS  WITHOUT SHORTNESS OF BREATH    Anesthesia Alert  NO--Difficult Airway  NO--History of neck surgery or radiation  NO--Limited ROM of neck  NO--History of Malignant hyperthermia  NO--Personal or family history of Pseudocholinesterase deficiency  NO--Prior Anesthesia Complication  NO--Latex Allergy  NO--Loose teeth  NO--History of Rheumatoid Arthritis  NO--CONRADO  NO-- BLEEDING RISK  NO--Other_____    As per patient, this is their complete medical and surgical history, including medications both prescribed or over the counter.  Patient verbalized understanding of instructions and was given the opportunity to ask questions and have them answered.

## 2023-06-17 DIAGNOSIS — Z01.818 ENCOUNTER FOR OTHER PREPROCEDURAL EXAMINATION: ICD-10-CM

## 2023-06-17 DIAGNOSIS — R97.20 ELEVATED PROSTATE SPECIFIC ANTIGEN [PSA]: ICD-10-CM

## 2023-06-17 LAB
CULTURE RESULTS: SIGNIFICANT CHANGE UP
SPECIMEN SOURCE: SIGNIFICANT CHANGE UP

## 2023-06-30 ENCOUNTER — OUTPATIENT (OUTPATIENT)
Dept: OUTPATIENT SERVICES | Facility: HOSPITAL | Age: 68
LOS: 1 days | End: 2023-06-30
Payer: COMMERCIAL

## 2023-06-30 ENCOUNTER — RESULT REVIEW (OUTPATIENT)
Age: 68
End: 2023-06-30

## 2023-06-30 DIAGNOSIS — Z98.890 OTHER SPECIFIED POSTPROCEDURAL STATES: Chronic | ICD-10-CM

## 2023-06-30 DIAGNOSIS — Z90.89 ACQUIRED ABSENCE OF OTHER ORGANS: Chronic | ICD-10-CM

## 2023-06-30 DIAGNOSIS — R97.20 ELEVATED PROSTATE SPECIFIC ANTIGEN [PSA]: ICD-10-CM

## 2023-06-30 PROCEDURE — 76377 3D RENDER W/INTRP POSTPROCES: CPT | Mod: 26

## 2023-06-30 PROCEDURE — 76377 3D RENDER W/INTRP POSTPROCES: CPT

## 2023-07-01 DIAGNOSIS — R97.20 ELEVATED PROSTATE SPECIFIC ANTIGEN [PSA]: ICD-10-CM

## 2023-07-10 ENCOUNTER — APPOINTMENT (OUTPATIENT)
Dept: UROLOGY | Facility: HOSPITAL | Age: 68
End: 2023-07-10

## 2023-07-10 ENCOUNTER — TRANSCRIPTION ENCOUNTER (OUTPATIENT)
Age: 68
End: 2023-07-10

## 2023-07-10 ENCOUNTER — RESULT REVIEW (OUTPATIENT)
Age: 68
End: 2023-07-10

## 2023-07-10 ENCOUNTER — OUTPATIENT (OUTPATIENT)
Dept: INPATIENT UNIT | Facility: HOSPITAL | Age: 68
LOS: 1 days | Discharge: HOME | End: 2023-07-10
Payer: COMMERCIAL

## 2023-07-10 VITALS — HEART RATE: 83 BPM | SYSTOLIC BLOOD PRESSURE: 138 MMHG | RESPIRATION RATE: 17 BRPM | DIASTOLIC BLOOD PRESSURE: 86 MMHG

## 2023-07-10 VITALS
TEMPERATURE: 97 F | HEIGHT: 69 IN | OXYGEN SATURATION: 97 % | WEIGHT: 199.96 LBS | SYSTOLIC BLOOD PRESSURE: 137 MMHG | DIASTOLIC BLOOD PRESSURE: 83 MMHG | HEART RATE: 69 BPM | RESPIRATION RATE: 17 BRPM

## 2023-07-10 DIAGNOSIS — R97.20 ELEVATED PROSTATE SPECIFIC ANTIGEN [PSA]: ICD-10-CM

## 2023-07-10 DIAGNOSIS — Z98.890 OTHER SPECIFIED POSTPROCEDURAL STATES: Chronic | ICD-10-CM

## 2023-07-10 DIAGNOSIS — Z90.89 ACQUIRED ABSENCE OF OTHER ORGANS: Chronic | ICD-10-CM

## 2023-07-10 PROCEDURE — 76872 US TRANSRECTAL: CPT | Mod: 26

## 2023-07-10 PROCEDURE — 76377 3D RENDER W/INTRP POSTPROCES: CPT | Mod: 26

## 2023-07-10 PROCEDURE — 55706 BX PRST8 NDL SAT SAMPLING: CPT

## 2023-07-10 PROCEDURE — G0416: CPT

## 2023-07-10 PROCEDURE — G0416: CPT | Mod: 26

## 2023-07-10 PROCEDURE — 93975 VASCULAR STUDY: CPT | Mod: 26

## 2023-07-10 RX ORDER — LOSARTAN/HYDROCHLOROTHIAZIDE 100MG-25MG
1 TABLET ORAL
Refills: 0 | DISCHARGE

## 2023-07-10 RX ORDER — SIMVASTATIN 20 MG/1
1 TABLET, FILM COATED ORAL
Qty: 0 | Refills: 0 | DISCHARGE

## 2023-07-10 RX ORDER — ASPIRIN/CALCIUM CARB/MAGNESIUM 324 MG
1 TABLET ORAL
Refills: 0 | DISCHARGE

## 2023-07-10 RX ORDER — HYDROMORPHONE HYDROCHLORIDE 2 MG/ML
0.5 INJECTION INTRAMUSCULAR; INTRAVENOUS; SUBCUTANEOUS
Refills: 0 | Status: DISCONTINUED | OUTPATIENT
Start: 2023-07-10 | End: 2023-07-10

## 2023-07-10 RX ORDER — ONDANSETRON 8 MG/1
4 TABLET, FILM COATED ORAL ONCE
Refills: 0 | Status: DISCONTINUED | OUTPATIENT
Start: 2023-07-10 | End: 2023-07-10

## 2023-07-10 RX ORDER — SODIUM CHLORIDE 9 MG/ML
1000 INJECTION, SOLUTION INTRAVENOUS
Refills: 0 | Status: DISCONTINUED | OUTPATIENT
Start: 2023-07-10 | End: 2023-07-10

## 2023-07-10 NOTE — ASU PATIENT PROFILE, ADULT - REASON FOR ADMISSION, PROFILE
Cardiac Catheterization Appropriate Use    [facility] Clinical Frailty Scale     [] 1. Very Fit  [] 2. Well  [x] 3. Managing Well  [] 4. Vulnerable  [] 5. Mildly Frail  [] 6. Moderately Frail [] 7. Severely Frail  [] 8. Very Severely Frail  [] 9. Terminally III        Heart Failure  Yes: NYHA Class II: Mild HF symptoms/activity intolerance (Able to do light yardwork, can walk normally on level ground)    If Yes,  Newly Diagnosed? []  Yes or [x]  No     If Yes, Heart Failure Type? []  Diastolic  [x]  Systolic  [] Unknown         Stress Test Peformed  []  Yes  [x]  No         If yes, specify test performed and must include risk of ischemia    StressTest Type Test Result Risk of Ischemia   [] Standard Exercise ST             (without imaging)  [] Stress Echo  [] ST Nuclear   [] ST with CMR    [] Positive                   []Negative  [] Indeterminate  [] Unavailable [] High   [] Intermediate  [] Low  [] Unavailable        [] Cardiac CTA (only pick one)     Indication(s) for Cath Lab Visit  [] 3VD   [] 2VD   [] 1VD   [] No Disease       (select all that apply)   [] Indeterminant      [] ACS<=24 hours    [x] ACS>24 hours  []New onset angina<=2 months  [] Worsening Angina  [] Resuscitated Cardiac Arrest   [x] Stable Known CAD  [] Suspected CAD  [] Valvular Disease  [] Pericardial Disease  [] Pre-Operative Evaluation  []  Syncope []Post-Cardiac Transplant  []Cardiac Arrhythmias   [x] Cardiomyopathy  [] LV dysfunction  [] Evaluation for Exercise Clearance  [] Other         Chest Pain Symptoms     [x] Typical Angina      []   Atypical                                Angina      [] Non-anginal Chest Pain []Asymptomatic      Cardiovascular Instability  [] Yes  [x]  No     If yes, specify instability type (select all that apply)    [] Persistent Ischemic                     Symptoms(chest pain)   [] Hemodynamic Instability(not        Cardiogenic shock)  [] Cardiogenic Shock  [] Refractory Cardiogenic Shock   [] Acute Heart Failure  Symptoms  [] Ventricular Arrhythmia        Evaluation for Surgery []  Cardiac Surgery        []  Non-Cardiac Surgery      Functional Capacity []<4 METS  []>= 4 METS without symptoms  [x]  >=4 mets with symptoms    []  Unknown     Surgical Risk []  Low     []  Intermediate    []High Risk Vascular  []  High Risk Non-Vascular     Solid Organ Transplant Surgery   []  No  []  Yes                  If yes, Donor   []  No  []  Yes                   If yes, Organ  []  Heart  []  Kidney    []  Liver  []  Lung   []  Pancreas  []  Other Organ         Prostates Biopsy

## 2023-07-10 NOTE — ASU DISCHARGE PLAN (ADULT/PEDIATRIC) - CARE PROVIDER_API CALL
Ryan Baker [place]  Urology  [address] [place] [address], [address] [address]  Phone: [phone]  Fax: [phone]  Follow Up Time: 2 weeks

## 2023-07-10 NOTE — BRIEF OPERATIVE NOTE - NSICDXBRIEFPROCEDURE_GEN_ALL_CORE_FT
PROCEDURES:  Biopsy of prostate by transperineal approach with integrated magnetic resonance-ultrasound guidance 10-Jul-2023 09:55:45  Alpa oTvar  Prostate bx, transperineal, needle, w/ stereotactic template guided saturation sampling incl imaging guid 10-Jul-2023 09:55:56  Alpa Tovar

## 2023-07-10 NOTE — ASU DISCHARGE PLAN (ADULT/PEDIATRIC) - NS MD DC FALL RISK RISK
For information on Fall & Injury Prevention, visit: [email or web address] OR  [email or web address] OR  https://www.cdc.gov/steadi/patient.html

## 2023-07-10 NOTE — ASU PATIENT PROFILE, ADULT - FALL HARM RISK - HARM RISK INTERVENTIONS

## 2023-07-13 LAB — SURGICAL PATHOLOGY STUDY: SIGNIFICANT CHANGE UP

## 2023-07-14 DIAGNOSIS — G47.33 OBSTRUCTIVE SLEEP APNEA (ADULT) (PEDIATRIC): ICD-10-CM

## 2023-07-14 DIAGNOSIS — R97.20 ELEVATED PROSTATE SPECIFIC ANTIGEN [PSA]: ICD-10-CM

## 2023-07-14 DIAGNOSIS — Z79.82 LONG TERM (CURRENT) USE OF ASPIRIN: ICD-10-CM

## 2023-07-14 DIAGNOSIS — Z85.46 PERSONAL HISTORY OF MALIGNANT NEOPLASM OF PROSTATE: ICD-10-CM

## 2023-07-27 ENCOUNTER — APPOINTMENT (OUTPATIENT)
Dept: UROLOGY | Facility: CLINIC | Age: 68
End: 2023-07-27
Payer: COMMERCIAL

## 2023-07-27 VITALS
SYSTOLIC BLOOD PRESSURE: 159 MMHG | OXYGEN SATURATION: 98 % | TEMPERATURE: 97.5 F | BODY MASS INDEX: 31.07 KG/M2 | DIASTOLIC BLOOD PRESSURE: 98 MMHG | HEART RATE: 83 BPM | HEIGHT: 68 IN | RESPIRATION RATE: 18 BRPM | WEIGHT: 205 LBS

## 2023-07-27 PROCEDURE — 99214 OFFICE O/P EST MOD 30 MIN: CPT

## 2023-07-30 NOTE — ADDENDUM
[FreeTextEntry1] : Documented by CHARLEE Good acting as a scribe for Dr. Harpreet Baker   All medical record entries made by the Scribe were at my, Dr. Baker direction and personally dictated by me.  I have reviewed the chart and agree that the record accurately reflects my personal performance of the history, physical exam, procedure and imaging.

## 2023-07-30 NOTE — HISTORY OF PRESENT ILLNESS
[FreeTextEntry1] : Patient is a 68-year-old male with very low risk prostate cancer on active surveillance.  His most recent PSA is 6.8 ng/mL.  As high as 7.5 in 2022.  He had a recent MRI which showed a PI-RADS 3 lesion.  Patient is now status post MRI guided fusion biopsy which was benign in all cores.  He reports feeling well.  He denies any dysuria and gross hematuria.

## 2023-07-30 NOTE — ASSESSMENT
[FreeTextEntry1] : Low risk prostate cancer on active surveillance.\par Follow-up MRI guided fusion biopsy was benign in all cores.\par \par [place] tasked per patient's request as a pathologist to send slides for second opinion.\par \par Follow-up 6 months with repeat PSA

## 2023-10-11 ENCOUNTER — NON-APPOINTMENT (OUTPATIENT)
Age: 68
End: 2023-10-11

## 2024-02-16 LAB
PSA FREE FLD-MCNC: 19 %
PSA FREE SERPL-MCNC: 1.1 NG/ML
PSA SERPL-MCNC: 5.71 NG/ML

## 2024-02-20 ENCOUNTER — OUTPATIENT (OUTPATIENT)
Dept: OUTPATIENT SERVICES | Facility: HOSPITAL | Age: 69
LOS: 1 days | End: 2024-02-20
Payer: COMMERCIAL

## 2024-02-20 DIAGNOSIS — M54.89 OTHER DORSALGIA: ICD-10-CM

## 2024-02-20 DIAGNOSIS — Z98.890 OTHER SPECIFIED POSTPROCEDURAL STATES: Chronic | ICD-10-CM

## 2024-02-20 DIAGNOSIS — Z90.89 ACQUIRED ABSENCE OF OTHER ORGANS: Chronic | ICD-10-CM

## 2024-02-20 PROCEDURE — 97161 PT EVAL LOW COMPLEX 20 MIN: CPT | Mod: GP

## 2024-02-21 DIAGNOSIS — M54.89 OTHER DORSALGIA: ICD-10-CM

## 2024-03-21 ENCOUNTER — APPOINTMENT (OUTPATIENT)
Dept: UROLOGY | Facility: CLINIC | Age: 69
End: 2024-03-21
Payer: COMMERCIAL

## 2024-03-21 VITALS
TEMPERATURE: 97.3 F | DIASTOLIC BLOOD PRESSURE: 96 MMHG | HEART RATE: 82 BPM | BODY MASS INDEX: 31.07 KG/M2 | HEIGHT: 68 IN | WEIGHT: 205 LBS | SYSTOLIC BLOOD PRESSURE: 157 MMHG

## 2024-03-21 DIAGNOSIS — R93.49 ABNORMAL RADIOLOGIC FINDINGS ON DIAGNOSITIC IMAGING OF OTHER URINARY ORGANS: ICD-10-CM

## 2024-03-21 PROCEDURE — 99214 OFFICE O/P EST MOD 30 MIN: CPT

## 2024-03-21 NOTE — REVIEW OF SYSTEMS
[Fever] : no fever [Chills] : no chills [Chest Pain] : no chest pain [Shortness Of Breath] : no shortness of breath [Abdominal Pain] : no abdominal pain [Vomiting] : no vomiting [see HPI] : see HPI [Negative] : Endocrine

## 2024-03-21 NOTE — HISTORY OF PRESENT ILLNESS
[FreeTextEntry1] : 68 year old with a history of low volume Karla 3+3 and 2% of 1 core in the mid base right side from prostate biopsy done 2017 -oncotype - insufficient volume of cancer to assess . s/p MRI guided biopsy of the prostate was found to not have cancer on repeat biopsy the biopsy from 7/2023 was reviewed with the patient   MRI prostate   MRI prostate 4/3/2023 6 x 5 mm PIRADS 3 lesion - left posteromedial 7 x 7 mm PIRADS 2 lesion - right posterolateral diffuse hypointensity and avid enhancement in the peripheral zone bilaterally - possibly inflammatory  MRI 1/2022 PIRADS 2 59 gram prostate PSA density 0.07 ng/ml  MRI 12/2020 IMPRESSION: 1. No suspicious prostatic lesion. 2. Nonspecific geographic areas of linear and wedge-shaped signal abnormality within the peripheral zone, without corresponding diffusion or perfusion abnormality, possibly reflecting sequela of chronic prostatitis and/or fibrosis. PI-RADS 2. 3. Mild BPH.  MRI 7/22/2019  55 grams  no hemorrhage BPH yes no lesions PIRADS 2  MRI 9/8/2017 - 50 gram prostate pirads 3    Patient presents to office today to review his most recent PSA.  PSA 01/2024 - 5.71 ng/ml with 19% free PSA 03/2023 - 6.8- ng/ml with 16% free  PSA 09/2022- 4.99 ng/mL with 19% free PSA 07/2022- 7.5 ng/mL with 24 % free PSA 01/2022 - 5.95 ng/ml  PSA 7/08/2021 - 4.33 ng/ml 18% free PSA 1/07/2021 - 4.04 ng/ml 17% free PSA 6/30/2020 - 4.21 with 17% free PSA 1/22/20 - 3.91 with 17% free PSA 7/11/2019 - 4.83 with 17% free PSA 4/8/2019 - 4.12 with 15% free  PSA 9/24/2018 - 3.85 PSA 9/1/2017 - 3.11  offers no new complaints feels well [Urinary Incontinence] : no urinary incontinence [Urinary Retention] : no urinary retention [Urinary Urgency] : urinary urgency [Urinary Frequency] : urinary frequency [Erectile Dysfunction] : Erectile Dysfunction [None] : None

## 2024-03-21 NOTE — LETTER BODY
[Dear  ___] : Dear  [unfilled], [Consult Letter:] : I had the pleasure of evaluating your patient, [unfilled]. [Please see my note below.] : Please see my note below. [Sincerely,] : Sincerely, [FreeTextEntry3] : Harpreet Baker MD, FACS

## 2024-03-21 NOTE — ASSESSMENT
[FreeTextEntry1] : 68 year old with a history of low volume Karla 3+3 and 2% of 1 core in the mid base right side from prostate biopsy done 2017 -oncotype - insufficient volume of cancer to assess . s/p MRI guided biopsy of the prostate was found to not have cancer on repeat biopsy the biopsy from 7/2023 was reviewed with the patient   MRI prostate   MRI prostate 4/3/2023 6 x 5 mm PIRADS 3 lesion - left posteromedial 7 x 7 mm PIRADS 2 lesion - right posterolateral diffuse hypointensity and avid enhancement in the peripheral zone bilaterally - possibly inflammatory  MRI 1/2022 PIRADS 2 59 gram prostate PSA density 0.07 ng/ml  MRI 12/2020 IMPRESSION: 1. No suspicious prostatic lesion. 2. Nonspecific geographic areas of linear and wedge-shaped signal abnormality within the peripheral zone, without corresponding diffusion or perfusion abnormality, possibly reflecting sequela of chronic prostatitis and/or fibrosis. PI-RADS 2. 3. Mild BPH.  MRI 7/22/2019  55 grams  no hemorrhage BPH yes no lesions PIRADS 2  MRI 9/8/2017 - 50 gram prostate pirads 3    Patient presents to office today to review his most recent PSA.  PSA 01/2024 - 5.71 ng/ml with 19% free PSA 03/2023 - 6.8- ng/ml with 16% free  PSA 09/2022- 4.99 ng/mL with 19% free PSA 07/2022- 7.5 ng/mL with 24 % free PSA 01/2022 - 5.95 ng/ml  PSA 7/08/2021 - 4.33 ng/ml 18% free PSA 1/07/2021 - 4.04 ng/ml 17% free PSA 6/30/2020 - 4.21 with 17% free PSA 1/22/20 - 3.91 with 17% free PSA 7/11/2019 - 4.83 with 17% free PSA 4/8/2019 - 4.12 with 15% free  PSA 9/24/2018 - 3.85 PSA 9/1/2017 - 3.11  offers no new complaints feels well  Plan patient with low risk prostate cancer negative repeat biopsy - PSA reviewed - MRI reviewed - f/u in 6 months with PSA all questions answered  lengthy discussion regarding the nature of [place] 3+3 prostate cancer discussed in detail the relevance of the findings on repeat biopsy and the MRI studies

## 2024-05-16 LAB — BACTERIA UR CULT: NORMAL

## 2024-06-05 ENCOUNTER — APPOINTMENT (OUTPATIENT)
Dept: UROLOGY | Facility: CLINIC | Age: 69
End: 2024-06-05
Payer: COMMERCIAL

## 2024-06-05 DIAGNOSIS — R39.9 UNSPECIFIED SYMPTOMS AND SIGNS INVOLVING THE GENITOURINARY SYSTEM: ICD-10-CM

## 2024-06-05 DIAGNOSIS — C61 MALIGNANT NEOPLASM OF PROSTATE: ICD-10-CM

## 2024-06-05 PROCEDURE — 81003 URINALYSIS AUTO W/O SCOPE: CPT | Mod: QW

## 2024-06-05 PROCEDURE — 99214 OFFICE O/P EST MOD 30 MIN: CPT

## 2024-06-05 NOTE — REVIEW OF SYSTEMS
[see HPI] : see HPI [Negative] : Endocrine [Fever] : no fever [Chills] : no chills [Chest Pain] : no chest pain [Shortness Of Breath] : no shortness of breath [Abdominal Pain] : no abdominal pain [Vomiting] : no vomiting

## 2024-06-05 NOTE — LETTER BODY
[Dear  ___] : Dear  [unfilled], [Consult Letter:] : I had the pleasure of evaluating your patient, [unfilled]. [Please see my note below.] : Please see my note below. [Sincerely,] : Sincerely, [FreeTextEntry3] : Harpreet Baker MD, FACS home

## 2024-06-05 NOTE — ASSESSMENT
[FreeTextEntry1] : 68 year old with a history of low volume Karla 3+3 and 2% of 1 core in the mid base right side from prostate biopsy done 2017 -oncotype - insufficient volume of cancer to assess . s/p MRI guided biopsy of the prostate was found to not have cancer on repeat biopsy the biopsy from 7/2023 was reviewed with the patient   MRI prostate   MRI prostate 4/3/2023 6 x 5 mm PIRADS 3 lesion - left posteromedial 7 x 7 mm PIRADS 2 lesion - right posterolateral diffuse hypointensity and avid enhancement in the peripheral zone bilaterally - possibly inflammatory  MRI 1/2022 PIRADS 2 59 gram prostate PSA density 0.07 ng/ml  MRI 12/2020 IMPRESSION: 1. No suspicious prostatic lesion. 2. Nonspecific geographic areas of linear and wedge-shaped signal abnormality within the peripheral zone, without corresponding diffusion or perfusion abnormality, possibly reflecting sequela of chronic prostatitis and/or fibrosis. PI-RADS 2. 3. Mild BPH.  MRI 7/22/2019  55 grams  no hemorrhage BPH yes no lesions PIRADS 2  MRI 9/8/2017 - 50 gram prostate pirads 3    Patient presents to office today to review his most recent PSA.  PSA 01/2024 - 5.71 ng/ml with 19% free PSA 03/2023 - 6.8- ng/ml with 16% free  PSA 09/2022- 4.99 ng/mL with 19% free PSA 07/2022- 7.5 ng/mL with 24 % free PSA 01/2022 - 5.95 ng/ml  PSA 7/08/2021 - 4.33 ng/ml 18% free PSA 1/07/2021 - 4.04 ng/ml 17% free PSA 6/30/2020 - 4.21 with 17% free PSA 1/22/20 - 3.91 with 17% free PSA 7/11/2019 - 4.83 with 17% free PSA 4/8/2019 - 4.12 with 15% free  PSA 9/24/2018 - 3.85 PSA 9/1/2017 - 3.11  he began to experience bothersome LUTS following the initiation of medication for weight loss denies dysuria only urgency and frequency 5/14/2024 - normal UA and Culture   Plan patient with low risk prostate cancer negative repeat biopsy bothersome urinary symptoms - on a diuretic and diet pill - consider tadalafil 5 mg po daily - will contact to review response - f/u 9/2024 for evaluation   [Urinary Symptom or Sign (788.99\R39.89)] : implantation

## 2024-06-05 NOTE — HISTORY OF PRESENT ILLNESS
[Urinary Urgency] : urinary urgency [Urinary Frequency] : urinary frequency [Erectile Dysfunction] : Erectile Dysfunction [None] : None [FreeTextEntry1] : 68 year old with a history of low volume Karla 3+3 and 2% of 1 core in the mid base right side from prostate biopsy done 2017 -oncotype - insufficient volume of cancer to assess . s/p MRI guided biopsy of the prostate was found to not have cancer on repeat biopsy the biopsy from 7/2023 was reviewed with the patient   MRI prostate   MRI prostate 4/3/2023 6 x 5 mm PIRADS 3 lesion - left posteromedial 7 x 7 mm PIRADS 2 lesion - right posterolateral diffuse hypointensity and avid enhancement in the peripheral zone bilaterally - possibly inflammatory  MRI 1/2022 PIRADS 2 59 gram prostate PSA density 0.07 ng/ml  MRI 12/2020 IMPRESSION: 1. No suspicious prostatic lesion. 2. Nonspecific geographic areas of linear and wedge-shaped signal abnormality within the peripheral zone, without corresponding diffusion or perfusion abnormality, possibly reflecting sequela of chronic prostatitis and/or fibrosis. PI-RADS 2. 3. Mild BPH.  MRI 7/22/2019  55 grams  no hemorrhage BPH yes no lesions PIRADS 2  MRI 9/8/2017 - 50 gram prostate pirads 3    Patient presents to office today to review his most recent PSA.  PSA 01/2024 - 5.71 ng/ml with 19% free PSA 03/2023 - 6.8- ng/ml with 16% free  PSA 09/2022- 4.99 ng/mL with 19% free PSA 07/2022- 7.5 ng/mL with 24 % free PSA 01/2022 - 5.95 ng/ml  PSA 7/08/2021 - 4.33 ng/ml 18% free PSA 1/07/2021 - 4.04 ng/ml 17% free PSA 6/30/2020 - 4.21 with 17% free PSA 1/22/20 - 3.91 with 17% free PSA 7/11/2019 - 4.83 with 17% free PSA 4/8/2019 - 4.12 with 15% free  PSA 9/24/2018 - 3.85 PSA 9/1/2017 - 3.11  he began to experience bothersome LUTS following the initiation of medication for weight loss denies dysuria only urgency and frequency 5/14/2024 - normal UA and Culture  [Urinary Incontinence] : no urinary incontinence [Urinary Retention] : no urinary retention

## 2024-06-06 LAB
BILIRUB UR QL STRIP: NORMAL
COLLECTION METHOD: NORMAL
GLUCOSE UR-MCNC: NORMAL
HCG UR QL: 0.2 EU/DL
HGB UR QL STRIP.AUTO: NORMAL
KETONES UR-MCNC: NORMAL
LEUKOCYTE ESTERASE UR QL STRIP: NORMAL
NITRITE UR QL STRIP: NORMAL
PH UR STRIP: 6
PROT UR STRIP-MCNC: NORMAL
SP GR UR STRIP: 1.02

## 2024-09-20 ENCOUNTER — LABORATORY RESULT (OUTPATIENT)
Age: 69
End: 2024-09-20

## 2024-09-23 ENCOUNTER — APPOINTMENT (OUTPATIENT)
Dept: UROLOGY | Facility: CLINIC | Age: 69
End: 2024-09-23
Payer: COMMERCIAL

## 2024-09-23 DIAGNOSIS — C61 MALIGNANT NEOPLASM OF PROSTATE: ICD-10-CM

## 2024-09-23 PROCEDURE — 99214 OFFICE O/P EST MOD 30 MIN: CPT

## 2024-09-25 NOTE — ASSESSMENT
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 No suspicious prostatic lesion. 2. Nonspecific geographic areas of linear and wedge-shaped signal abnormality within the peripheral zone, without corresponding diffusion or perfusion abnormality, possibly reflecting sequela of chronic prostatitis and/or fibrosis. PI-RADS 2. 3. Mild BPH.  MRI 7/22/2019  55 grams  no hemorrhage BPH yes no lesions PIRADS 2  MRI 9/8/2017 - 50 gram prostate pirads 3    Patient presents to office today to review his most recent PSA.  PSA 09/2024 - 6.63 ng/ml with 18% free PSA 01/2024 - 5.71 ng/ml with 19% free PSA 03/2023 - 6.8- ng/ml with 16% free  PSA 09/2022- 4.99 ng/mL with 19% free PSA 07/2022- 7.5 ng/mL with 24 % free PSA 01/2022 - 5.95 ng/ml  PSA 7/08/2021 - 4.33 ng/ml 18% free PSA 1/07/2021 - 4.04 ng/ml 17% free PSA 6/30/2020 - 4.21 with 17% free PSA 1/22/20 - 3.91 with 17% free PSA 7/11/2019 - 4.83 with 17% free PSA 4/8/2019 - 4.12 with 15% free  PSA 9/24/2018 - 3.85 PSA 9/1/2017 - 3.11  Plan  patient with low risk prostate cancer negative repeat biopsy bothersome urinary symptoms - on a diuretic and diet pill - tadalafil 5 mg po daily - repeat PSA in 6 months - MRI prior to f/u all questions answered

## 2024-09-25 NOTE — HISTORY OF PRESENT ILLNESS
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 No suspicious prostatic lesion. 2. Nonspecific geographic areas of linear and wedge-shaped signal abnormality within the peripheral zone, without corresponding diffusion or perfusion abnormality, possibly reflecting sequela of chronic prostatitis and/or fibrosis. PI-RADS 2. 3. Mild BPH.  MRI 7/22/2019  55 grams  no hemorrhage BPH yes no lesions PIRADS 2  MRI 9/8/2017 - 50 gram prostate pirads 3    Patient presents to office today to review his most recent PSA.  PSA 09/2024 - 6.63 ng/ml with 18% free PSA 01/2024 - 5.71 ng/ml with 19% free PSA 03/2023 - 6.8- ng/ml with 16% free  PSA 09/2022- 4.99 ng/mL with 19% free PSA 07/2022- 7.5 ng/mL with 24 % free PSA 01/2022 - 5.95 ng/ml  PSA 7/08/2021 - 4.33 ng/ml 18% free PSA 1/07/2021 - 4.04 ng/ml 17% free PSA 6/30/2020 - 4.21 with 17% free PSA 1/22/20 - 3.91 with 17% free PSA 7/11/2019 - 4.83 with 17% free PSA 4/8/2019 - 4.12 with 15% free  PSA 9/24/2018 - 3.85 PSA 9/1/2017 - 3.11   [Urinary Incontinence] : no urinary incontinence [Urinary Retention] : no urinary retention [Urinary Urgency] : urinary urgency [Urinary Frequency] : urinary frequency [Erectile Dysfunction] : Erectile Dysfunction [None] : None

## 2025-04-17 LAB
PSA FREE FLD-MCNC: 21 %
PSA FREE SERPL-MCNC: 0.95 NG/ML
PSA SERPL-MCNC: 4.6 NG/ML

## 2025-04-24 ENCOUNTER — APPOINTMENT (OUTPATIENT)
Dept: UROLOGY | Facility: CLINIC | Age: 70
End: 2025-04-24
Payer: COMMERCIAL

## 2025-04-24 VITALS
SYSTOLIC BLOOD PRESSURE: 145 MMHG | DIASTOLIC BLOOD PRESSURE: 100 MMHG | HEART RATE: 114 BPM | WEIGHT: 205 LBS | HEIGHT: 68 IN | BODY MASS INDEX: 31.07 KG/M2

## 2025-04-24 DIAGNOSIS — C61 MALIGNANT NEOPLASM OF PROSTATE: ICD-10-CM

## 2025-04-24 PROCEDURE — 99214 OFFICE O/P EST MOD 30 MIN: CPT

## 2025-05-19 ENCOUNTER — NON-APPOINTMENT (OUTPATIENT)
Age: 70
End: 2025-05-19

## 2025-05-20 ENCOUNTER — NON-APPOINTMENT (OUTPATIENT)
Age: 70
End: 2025-05-20

## 2025-05-21 ENCOUNTER — APPOINTMENT (OUTPATIENT)
Dept: NEUROSURGERY | Facility: CLINIC | Age: 70
End: 2025-05-21
Payer: COMMERCIAL

## 2025-05-21 ENCOUNTER — NON-APPOINTMENT (OUTPATIENT)
Age: 70
End: 2025-05-21

## 2025-05-21 VITALS — BODY MASS INDEX: 29.55 KG/M2 | HEIGHT: 68 IN | WEIGHT: 195 LBS

## 2025-05-21 DIAGNOSIS — M48.061 SPINAL STENOSIS, LUMBAR REGION WITHOUT NEUROGENIC CLAUDICATION: ICD-10-CM

## 2025-05-21 DIAGNOSIS — Z80.42 FAMILY HISTORY OF MALIGNANT NEOPLASM OF PROSTATE: ICD-10-CM

## 2025-05-21 DIAGNOSIS — Z82.49 FAMILY HISTORY OF ISCHEMIC HEART DISEASE AND OTHER DISEASES OF THE CIRCULATORY SYSTEM: ICD-10-CM

## 2025-05-21 DIAGNOSIS — Z85.46 PERSONAL HISTORY OF MALIGNANT NEOPLASM OF PROSTATE: ICD-10-CM

## 2025-05-21 PROCEDURE — 99204 OFFICE O/P NEW MOD 45 MIN: CPT

## 2025-05-21 RX ORDER — LOSARTAN POTASSIUM AND HYDROCHLOROTHIAZIDE 25; 100 MG/1; MG/1
100-25 TABLET ORAL
Refills: 0 | Status: ACTIVE | COMMUNITY

## 2025-05-30 ENCOUNTER — OUTPATIENT (OUTPATIENT)
Dept: OUTPATIENT SERVICES | Facility: HOSPITAL | Age: 70
LOS: 1 days | End: 2025-05-30
Payer: COMMERCIAL

## 2025-05-30 ENCOUNTER — RESULT REVIEW (OUTPATIENT)
Age: 70
End: 2025-05-30

## 2025-05-30 DIAGNOSIS — Z98.890 OTHER SPECIFIED POSTPROCEDURAL STATES: Chronic | ICD-10-CM

## 2025-05-30 DIAGNOSIS — M48.061 SPINAL STENOSIS, LUMBAR REGION WITHOUT NEUROGENIC CLAUDICATION: ICD-10-CM

## 2025-05-30 DIAGNOSIS — Z90.89 ACQUIRED ABSENCE OF OTHER ORGANS: Chronic | ICD-10-CM

## 2025-05-30 PROCEDURE — 72110 X-RAY EXAM L-2 SPINE 4/>VWS: CPT

## 2025-05-30 PROCEDURE — 72110 X-RAY EXAM L-2 SPINE 4/>VWS: CPT | Mod: 26

## 2025-05-31 DIAGNOSIS — M48.061 SPINAL STENOSIS, LUMBAR REGION WITHOUT NEUROGENIC CLAUDICATION: ICD-10-CM

## 2025-06-02 ENCOUNTER — NON-APPOINTMENT (OUTPATIENT)
Age: 70
End: 2025-06-02

## 2025-06-02 ENCOUNTER — APPOINTMENT (OUTPATIENT)
Dept: NEUROSURGERY | Facility: CLINIC | Age: 70
End: 2025-06-02
Payer: COMMERCIAL

## 2025-06-02 VITALS — HEIGHT: 68 IN | BODY MASS INDEX: 29.55 KG/M2 | WEIGHT: 195 LBS

## 2025-06-02 DIAGNOSIS — M48.061 SPINAL STENOSIS, LUMBAR REGION WITHOUT NEUROGENIC CLAUDICATION: ICD-10-CM

## 2025-06-02 PROCEDURE — 99213 OFFICE O/P EST LOW 20 MIN: CPT

## 2025-06-12 NOTE — H&P PST ADULT - SPO2 (%)
Caller: Cristóbal Jayna Anne    Relationship: Self    Best call back number: [phone]    Requested Prescriptions:   Requested Prescriptions     Pending Prescriptions Disp Refills    HYDROcodone-acetaminophen (NORCO) 7.5-325 MG per tablet 60 tablet 0     Sig: Take 1 tablet by mouth Every 6 (Six) Hours As Needed for Moderate Pain.    QUEtiapine (SEROquel) 100 MG tablet 90 tablet 3     Sig: Take 1 tablet by mouth Every Night.        Pharmacy where request should be sent:    [facility] [phone]  Last office visit with prescribing clinician: 4/11/2025   Last telemedicine visit with prescribing clinician: Visit date not found   Next office visit with prescribing clinician: 7/11/2025     Additional details provided by patient: PATIENT IS OUT OF MEDICATIONS    Does the patient have less than a 3 day supply:  [x] Yes  [] No    Would you like a call back once the refill request has been completed: [] Yes [x] No    If the office needs to give you a call back, can they leave a voicemail: [] Yes [x] No    Payton Rios   06/12/25 11:35 EDT        100

## 2025-09-12 ENCOUNTER — APPOINTMENT (OUTPATIENT)
Dept: NEUROSURGERY | Facility: HOSPITAL | Age: 70
End: 2025-09-12